# Patient Record
Sex: MALE | Race: WHITE | Employment: UNEMPLOYED | URBAN - METROPOLITAN AREA
[De-identification: names, ages, dates, MRNs, and addresses within clinical notes are randomized per-mention and may not be internally consistent; named-entity substitution may affect disease eponyms.]

---

## 2017-02-16 DIAGNOSIS — Z21 HIV (HUMAN IMMUNODEFICIENCY VIRUS INFECTION) (H): ICD-10-CM

## 2017-02-16 RX ORDER — DEXAMETHASONE 0.75 MG/1
TABLET ORAL
Qty: 30 TABLET | Refills: 3 | Status: SHIPPED | OUTPATIENT
Start: 2017-02-16 | End: 2017-04-27

## 2017-02-16 RX ORDER — ATAZANAVIR AND COBICISTAT 300; 150 MG/1; MG/1
TABLET ORAL
Qty: 30 TABLET | Refills: 3 | Status: SHIPPED | OUTPATIENT
Start: 2017-02-16 | End: 2017-04-27

## 2017-03-17 DIAGNOSIS — B20 HUMAN IMMUNODEFICIENCY VIRUS (HIV) DISEASE (H): Primary | ICD-10-CM

## 2017-03-17 NOTE — NURSING NOTE
Per [facility] Ambulatory Care Protocol, Pt is due for routine labs based on disease state or monitoring of medications. Lab orders entered.  Serenity Vang RN

## 2017-04-04 DIAGNOSIS — B20 HUMAN IMMUNODEFICIENCY VIRUS (HIV) DISEASE (H): ICD-10-CM

## 2017-04-04 LAB
ALBUMIN SERPL-MCNC: 3.9 G/DL (ref 3.4–5)
ALP SERPL-CCNC: 110 U/L (ref 40–150)
ALT SERPL W P-5'-P-CCNC: 36 U/L (ref 0–70)
ANION GAP SERPL CALCULATED.3IONS-SCNC: 7 MMOL/L (ref 3–14)
AST SERPL W P-5'-P-CCNC: 37 U/L (ref 0–45)
BASOPHILS # BLD AUTO: 0 10E9/L (ref 0–0.2)
BASOPHILS NFR BLD AUTO: 0.5 %
BILIRUB SERPL-MCNC: 2.7 MG/DL (ref 0.2–1.3)
BUN SERPL-MCNC: 16 MG/DL (ref 7–30)
CALCIUM SERPL-MCNC: 8.7 MG/DL (ref 8.5–10.1)
CD3 CELLS # BLD: 1915 CELLS/UL (ref 603–2990)
CD3 CELLS NFR BLD: 70 % (ref 49–84)
CD3+CD4+ CELLS # BLD: 702 CELLS/UL (ref 441–2156)
CD3+CD4+ CELLS NFR BLD: 26 % (ref 28–63)
CD3+CD4+ CELLS/CD3+CD8+ CLL BLD: 0.62 % (ref 1.4–2.6)
CD3+CD8+ CELLS # BLD: 1158 CELLS/UL (ref 125–1312)
CD3+CD8+ CELLS NFR BLD: 42 % (ref 10–40)
CHLORIDE SERPL-SCNC: 105 MMOL/L (ref 94–109)
CO2 SERPL-SCNC: 29 MMOL/L (ref 20–32)
CREAT SERPL-MCNC: 0.8 MG/DL (ref 0.66–1.25)
DIFFERENTIAL METHOD BLD: NORMAL
EOSINOPHIL # BLD AUTO: 0.2 10E9/L (ref 0–0.7)
EOSINOPHIL NFR BLD AUTO: 2.2 %
ERYTHROCYTE [DISTWIDTH] IN BLOOD BY AUTOMATED COUNT: 12.7 % (ref 10–15)
GFR SERPL CREATININE-BSD FRML MDRD: ABNORMAL ML/MIN/1.7M2
GLUCOSE SERPL-MCNC: 104 MG/DL (ref 70–99)
HCT VFR BLD AUTO: 42.6 % (ref 40–53)
HGB BLD-MCNC: 14.3 G/DL (ref 13.3–17.7)
IFC SPECIMEN: ABNORMAL
IMM GRANULOCYTES # BLD: 0 10E9/L (ref 0–0.4)
IMM GRANULOCYTES NFR BLD: 0.2 %
IMMUNODEFICIENCY MARKERS SPEC-IMP: ABNORMAL
LYMPHOCYTES # BLD AUTO: 2.6 10E9/L (ref 0.8–5.3)
LYMPHOCYTES NFR BLD AUTO: 32.5 %
MCH RBC QN AUTO: 31 PG (ref 26.5–33)
MCHC RBC AUTO-ENTMCNC: 33.6 G/DL (ref 31.5–36.5)
MCV RBC AUTO: 92 FL (ref 78–100)
MONOCYTES # BLD AUTO: 0.7 10E9/L (ref 0–1.3)
MONOCYTES NFR BLD AUTO: 8.6 %
NEUTROPHILS # BLD AUTO: 4.5 10E9/L (ref 1.6–8.3)
NEUTROPHILS NFR BLD AUTO: 56 %
NRBC # BLD AUTO: 0 10*3/UL
NRBC BLD AUTO-RTO: 0 /100
PLATELET # BLD AUTO: 284 10E9/L (ref 150–450)
POTASSIUM SERPL-SCNC: 4.2 MMOL/L (ref 3.4–5.3)
PROT SERPL-MCNC: 7.4 G/DL (ref 6.8–8.8)
RBC # BLD AUTO: 4.62 10E12/L (ref 4.4–5.9)
SODIUM SERPL-SCNC: 140 MMOL/L (ref 133–144)
WBC # BLD AUTO: 8.1 10E9/L (ref 4–11)

## 2017-04-04 PROCEDURE — 87536 HIV-1 QUANT&REVRSE TRNSCRPJ: CPT | Performed by: COLON & RECTAL SURGERY

## 2017-04-04 PROCEDURE — 86360 T CELL ABSOLUTE COUNT/RATIO: CPT | Performed by: COLON & RECTAL SURGERY

## 2017-04-04 PROCEDURE — 86359 T CELLS TOTAL COUNT: CPT | Performed by: COLON & RECTAL SURGERY

## 2017-04-07 LAB
HIV1 RNA # PLAS NAA DL=20: 24 {COPIES}/ML
HIV1 RNA SERPL NAA+PROBE-LOG#: 1.4 {LOG_COPIES}/ML

## 2017-04-26 ENCOUNTER — TELEPHONE (OUTPATIENT)
Dept: INFECTIOUS DISEASES | Facility: CLINIC | Age: 32
End: 2017-04-26

## 2017-04-27 ENCOUNTER — OFFICE VISIT (OUTPATIENT)
Dept: INFECTIOUS DISEASES | Facility: CLINIC | Age: 32
End: 2017-04-27
Attending: COLON & RECTAL SURGERY
Payer: MEDICAID

## 2017-04-27 VITALS
SYSTOLIC BLOOD PRESSURE: 123 MMHG | BODY MASS INDEX: 26.92 KG/M2 | HEIGHT: 68 IN | TEMPERATURE: 98 F | WEIGHT: 177.6 LBS | HEART RATE: 92 BPM | DIASTOLIC BLOOD PRESSURE: 69 MMHG

## 2017-04-27 DIAGNOSIS — B20 HUMAN IMMUNODEFICIENCY VIRUS (HIV) DISEASE (H): Primary | ICD-10-CM

## 2017-04-27 DIAGNOSIS — Z21 HIV (HUMAN IMMUNODEFICIENCY VIRUS INFECTION) (H): ICD-10-CM

## 2017-04-27 PROCEDURE — 88112 CYTOPATH CELL ENHANCE TECH: CPT | Performed by: COLON & RECTAL SURGERY

## 2017-04-27 PROCEDURE — 99213 OFFICE O/P EST LOW 20 MIN: CPT | Mod: ZF

## 2017-04-27 RX ORDER — BUPRENORPHINE HYDROCHLORIDE AND NALOXONE HYDROCHLORIDE DIHYDRATE 8; 2 MG/1; MG/1
1 TABLET SUBLINGUAL 2 TIMES DAILY
COMMUNITY
End: 2018-03-18

## 2017-04-27 ASSESSMENT — PAIN SCALES - GENERAL: PAINLEVEL: NO PAIN (0)

## 2017-04-27 NOTE — MR AVS SNAPSHOT
After Visit Summary   4/27/2017    Tejinder Doyle    MRN: [id number]           Patient Information     Date Of Birth          1985        Visit Information        Provider Department      4/27/2017 1:00 PM Melania Fried MD [facility] Infectious Diseases        Today's Diagnoses     Human immunodeficiency virus (HIV) disease (H)    -  1    HIV (Human Immunodeficiency Virus Infection)           Follow-ups after your visit        Follow-up notes from your care team     Return in about 2 months (around 6/27/2017).      Who to contact     If you have questions or need follow up information about today's clinic visit or your schedule please contact [facility] AND INFECTIOUS DISEASES directly at [phone].  Normal or non-critical lab and imaging results will be communicated to you by PubliAtishart, letter or phone within 4 business days after the clinic has received the results. If you do not hear from us within 7 days, please contact the clinic through PubliAtishart or phone. If you have a critical or abnormal lab result, we will notify you by phone as soon as possible.  Submit refill requests through Askem or call your pharmacy and they will forward the refill request to us. Please allow 3 business days for your refill to be completed.          Additional Information About Your Visit        MyChart Information     Askem gives you secure access to your electronic health record. If you see a primary care provider, you can also send messages to your care team and make appointments. If you have questions, please call your primary care clinic.  If you do not have a primary care provider, please call [phone] and they will assist you.        Care EveryWhere ID     This is your Care EveryWhere ID. This could be used by other organizations to access your [place] medical records  [id number]        Your Vitals Were     Pulse Temperature Height BMI (Body Mass Index)  "         92 98  F (36.7  C) (Oral) 1.727 m (5' 8\") 27 kg/m2         Blood Pressure from Last 3 Encounters:   04/27/17 123/69   04/07/16 131/81   02/17/16 137/81    Weight from Last 3 Encounters:   04/27/17 80.6 kg (177 lb 9.6 oz)   04/07/16 81.8 kg (180 lb 4.8 oz)   02/17/16 80.5 kg (177 lb 8 oz)              We Performed the Following     Anal Pap Smear          Today's Medication Changes          These changes are accurate as of: 4/27/17 11:59 PM.  If you have any questions, ask your nurse or doctor.               Start taking these medicines.        Dose/Directions    emtricitabine-tenofovir -25 MG per tablet   Commonly known as:  DESCOVY   Used for:  Human immunodeficiency virus (HIV) disease (H)   Started by:  Melania Fried MD        Dose:  1 tablet   Take 1 tablet by mouth daily   Quantity:  90 tablet   Refills:  3         These medicines have changed or have updated prescriptions.        Dose/Directions    atazanavir-cobicistat 300-150 MG table   Commonly known as:  EVOTAZ   This may have changed:  See the new instructions.   Used for:  HIV (human immunodeficiency virus infection) (H)   Changed by:  Melania Fried MD        Dose:  1 tablet   Take 1 tablet by mouth daily   Quantity:  90 tablet   Refills:  3         Stop taking these medicines if you haven't already. Please contact your care team if you have questions.     citalopram 20 MG tablet   Commonly known as:  celeXA   Stopped by:  Melania Fried MD           TRUVADA 200-300 MG per tablet   Generic drug:  emtricitabine-tenofovir   Stopped by:  Melania Fried MD                Where to get your medicines      These medications were sent to [place] [id number] [facility] [address] [place] [address] [address] [place] [id number]    Hours:  TRANSPLANT PHONE NUMBER [phone] Phone:  [phone]     atazanavir-cobicistat 300-150 MG table    emtricitabine-tenofovir AF " 200-25 MG per tablet                Primary Care Provider Office Phone #    [facility] [phone]       No address on file        Thank you!     Thank you for choosing [facility] AND INFECTIOUS DISEASES  for your care. Our goal is always to provide you with excellent care. Hearing back from our patients is one way we can continue to improve our services. Please take a few minutes to complete the written survey that you may receive in the mail after your visit with us. Thank you!             Your Updated Medication List - Protect others around you: Learn how to safely use, store and throw away your medicines at www.disposemymeds.org.          This list is accurate as of: 4/27/17 11:59 PM.  Always use your most recent med list.                   Brand Name Dispense Instructions for use    atazanavir-cobicistat 300-150 MG table    EVOTAZ    90 tablet    Take 1 tablet by mouth daily       BACLOFEN PO      Take 10 mg by mouth 3 times daily       buprenorphine-naloxone 8-2 MG Subl sublingual tablet    SUBOXONE     Place 1 tablet under the tongue 2 times daily 2 MG       emtricitabine-tenofovir -25 MG per tablet    DESCOVY    90 tablet    Take 1 tablet by mouth daily       GABAPENTIN PO      Take 400 mg by mouth 3 times daily 100mg-400mg TID       LAMICTAL PO      Take 100 mg by mouth daily

## 2017-04-27 NOTE — LETTER
Date:May 2, 2017      Patient was self referred, no letter generated. Do not send.        [facility] Health Information

## 2017-04-27 NOTE — PROGRESS NOTES
April 27, 2017    REASON FOR VISIT: Follow up HIV.     HISTORY OF PRESENT ILLNESS:    Mr. Doyle is a very pleasant 31-year-old gentleman who is followed for HIV/AIDS (CD4 alyssia 13) .  He returns today for routine follow up.     Patient was last seen approximately a year ago and since that time he relapsed on IV methamphetamine and was actually in long-term from Jan-Feb, and in treatment program since then. He is actually due to be discharged to a sober house in four days. He is looking forward to the move.    Patient is taking Evotaz and Truvada. He was off for a period of time earlier in the year when he was using however he has been consistently taking it (meds have been administered to him) since January. He denies any side effects or other concerns with this med. He has also been started on lamotrigine, Suboxone and gabapentin. He denies any significant side effects from these meds as well.     He is physically feeling well and has not had any recent illnesses.  He feels that his mood is stable currently. He plans to continue following with a mental health provider after discharge from his treatment program.     He went to [facility] and was tested for gonorrhea, chlamydia and Hep C one month ago and all were negative. He reports his RPR was 1.     PAST MEDICAL HISTORY:  1. HIV diagnosed in 08/2010, started on Atripla 10/2010. CD4 alyssia 13. Stopped treatment in 9/12 (developed K103 mutation); started Stribild 6/2013.  Off from 10/2014 - 8/2014.  Kaylee/pheno with Ral and Elvitegravir resistance. Boosted atazanavir+ truvada initiated 8/2014.  Stopped winter of 2014. Evotaz + Truvada started 1/2016. Patient took break from medications when relapsed using methamphetamine however has been taking with 100% adherence since January.   2. 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 Off of suppressive valtrex.  3. Anxiety.  4. Methamphetamine and benzodiazepine abuse with multiple relapses  5. Status post excision of epidermal  "inclusion cyst, August 2014.     MEDICATIONS:  Current Outpatient Prescriptions   Medication     LamoTRIgine (LAMICTAL PO)     GABAPENTIN PO     BACLOFEN PO     buprenorphine-naloxone (SUBOXONE) 8-2 MG SUBL sublingual tablet     emtricitabine-tenofovir AF (DESCOVY) 200-25 MG per tablet     atazanavir-cobicistat (EVOTAZ) 300-150 MG table     No current facility-administered medications for this visit.        FAMILY/SOCIAL HISTORY:  Reviewed.      EXAM:  /69  Pulse 92  Temp 98  F (36.7  C) (Oral)  Ht 1.727 m (5' 8\")  Wt 80.6 kg (177 lb 9.6 oz)  BMI 27 kg/m2    GEN: Pleasant healthy appearing male in no distress  HEENT: PERRL, no scleral icterus.  Moist mucosal membranes. No thrush, erythema, or oral ulcerations.   NECK: Supple, nontender.  CARDIAC: RRR, normal S1/S2, no murmurs or rubs.   RESPIRATORY: Lung fields clear to auscultation bilaterally, no wheezing or crackles.  ABDOMEN: Soft, non tender and nondistended. Normoactive bowel sounds.  EXTREMITIES: No edema.  Warm and well perfused.  SKIN:  Multiple tattoos. No acute rashes/lesions.   Neuro: Grossly intact.   Psych: Affect normal.  Speech fluent and appropriate.      LAB DATA:  4/4/17:  T Bili 2.7, CMP otherwise wnl  WBC 8.1  Hgb 14.3   Plts 284    ASSESSMENT AND RECOMMENDATIONS: 30 year old year old male with HIV/AIDS (CD4 alyssia 13) and recurrent methamphetamine/benzodiazepam addiction currently in inpatient treatment with plan to discharge in four days, who is returning to clinic for follow up.     1)  HIV/AIDS:   Longstanding disease with multiple periods of being off of therapy, generally during times of substance abuse relapse.  Now sober since early January and has been receiving medications (Evotaz and Truvada) while in [facility] and in chem dep treatment - denies side effects.  Despite period of interruption in therapy corresponding to relapse of methamphetamine use during 2016, on last check 4/4/17 the patient's viral load was 24 and CD4 count " 702.   - Cont Evotaz, will replace Truvada with Descovy. Discussed with patient and he is agreeable.   - patient received screen for gonorrhea, chlamydia and Hep C one month ago at [facility] and all were negative. He reports his RPR was 1 (Diagnosed 2/16 with RPR 32, received 3 treatment doses of bicillin and titer down to 8 on 4/16).   - return to clinic in 2-3 months for repeat viral load, T cell subset    2)  Methamphetamine/benzodiazepine abuse:    Relapse this year leading to [facility] from Jan - Feb 2017. Currently in treatment program and plan to discharge in four days. Hep C recently check at [facility] and negative. Patient is on Suboxone.   - Will be living in sober house  - HCV negative over 3 months after last IVDU    3) Depression/anxiety:    Will continue to follow with mental health after discharge from treatment. He has been started on lamotrigine, gabapentin and is also on Suboxone. Reviewed potential interactions with Lamotrigine and Suboxone today - mostly slighlty increased levels of lamotrigine and Suboxone - patient is on relatively low doses of these and is not having rosy side effects. If he were to switch off cobicistat he may experience decrease in levels of these meds.   - Monitor for med changes    4)  Social issues:  He is in the process of applying for social security given difficulty working while trying to deal with his addiction.  He knows that we are happy to provide any medical documentation that is needed.     6) Routine HIV care.   a. Prophylaxis.    -None needed given CD4.  b. Infectious Disease screening.   -HCV  reported neg [facility] 3/17   -Toxo IgG neg   -GC/chlamydia reported neg at [facility] 3/17   -Syphilis RPR titer reportedly 1 at [facility] 3/17 (down from 32 prior to treatment)   -Quant gold negative August 2014  c. Vaccination status.    -Completed firstTwinrix vaccine series April 2011 but did not develop an antibody response to either; repeat series completed  earlier this year.  Seroconverted for Hep B but not Hep A.  Seems unlikely that repeat doses would lead to seroconversion at this point.  Discussed risk factors and HAV transmission.  Would consider Hep A immune globulin if going to high risk area but otherwise not indicated.    -Pneumovax 2010; booster 2016   -Prevnar August 2014   -Influenza 2015   -Gardasil 2012   -Tdap August 2014   - Completed Menactra  d. Cardiovascular and renal screening.    -Lipids: cont yearly screening.     -BP okay today.   -Cr has been normal   -No longer smoking.  e. Cancer screening.    -Anal pap today   -No longer using tanning beds.      Tejinder will return to clinic in 2-3 months, sooner if issues arise.     Patient seen and discussed with attending physician Dr. Melania Holcomb  PGY-3 Internal Medicine  [facility] [phone]    Attending Attestation:  I evaluated Mr. Doyle with resident Dr. Bravo.  I have reviewed today's labs, vitals and imaging results. This note reflects our joint findings, assessment and recommendations.    Melania Fried MD  [phone]

## 2017-04-27 NOTE — LETTER
4/27/2017       RE: Tejinder Doyle  [address]     [facility] [address]     Dear Colleague,    Thank you for referring your patient, Tejinder Doyle, to the [facility] AND INFECTIOUS DISEASES at [facility]. Please see a copy of my visit note below.    April 27, 2017    REASON FOR VISIT: Follow up HIV.     HISTORY OF PRESENT ILLNESS:    Mr. Doyle is a very pleasant 31-year-old gentleman who is followed for HIV/AIDS (CD4 alyssia 13) .  He returns today for routine follow up.     Patient was last seen approximately a year ago and since that time he relapsed on IV methamphetamine and was actually in half-way from Jan-Feb, and in treatment program since then. He is actually due to be discharged to a sober house in four days. He is looking forward to the move.    Patient is taking Evotaz and Truvada. He was off for a period of time earlier in the year when he was using however he has been consistently taking it (meds have been administered to him) since January. He denies any side effects or other concerns with this med. He has also been started on lamotrigine, Suboxone and gabapentin. He denies any significant side effects from these meds as well.     He is physically feeling well and has not had any recent illnesses.  He feels that his mood is stable currently. He plans to continue following with a mental health provider after discharge from his treatment program.     He went to [facility] and was tested for gonorrhea, chlamydia and Hep C one month ago and all were negative. He reports his RPR was 1.     PAST MEDICAL HISTORY:  1. HIV diagnosed in 08/2010, started on Atripla 10/2010. CD4 alyssia 13. Stopped treatment in 9/12 (developed K103 mutation); started Stribild 6/2013.  Off from 10/2014 - 8/2014.  Kaylee/pheno with Ral and Elvitegravir resistance. Boosted atazanavir+ truvada initiated 8/2014.  Stopped winter of 2014. Evotaz + Truvada started  "1/2016. Patient took break from medications when relapsed using methamphetamine however has been taking with 100% adherence since January.   2. 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 Off of suppressive valtrex.  3. Anxiety.  4. Methamphetamine and benzodiazepine abuse with multiple relapses  5. Status post excision of epidermal inclusion cyst, August 2014.     MEDICATIONS:  Current Outpatient Prescriptions   Medication     LamoTRIgine (LAMICTAL PO)     GABAPENTIN PO     BACLOFEN PO     buprenorphine-naloxone (SUBOXONE) 8-2 MG SUBL sublingual tablet     emtricitabine-tenofovir AF (DESCOVY) 200-25 MG per tablet     atazanavir-cobicistat (EVOTAZ) 300-150 MG table     No current facility-administered medications for this visit.        FAMILY/SOCIAL HISTORY:  Reviewed.      EXAM:  /69  Pulse 92  Temp 98  F (36.7  C) (Oral)  Ht 1.727 m (5' 8\")  Wt 80.6 kg (177 lb 9.6 oz)  BMI 27 kg/m2    GEN: Pleasant healthy appearing male in no distress  HEENT: PERRL, no scleral icterus.  Moist mucosal membranes. No thrush, erythema, or oral ulcerations.   NECK: Supple, nontender.  CARDIAC: RRR, normal S1/S2, no murmurs or rubs.   RESPIRATORY: Lung fields clear to auscultation bilaterally, no wheezing or crackles.  ABDOMEN: Soft, non tender and nondistended. Normoactive bowel sounds.  EXTREMITIES: No edema.  Warm and well perfused.  SKIN:  Multiple tattoos. No acute rashes/lesions.   Neuro: Grossly intact.   Psych: Affect normal.  Speech fluent and appropriate.      LAB DATA:  4/4/17:  T Bili 2.7, CMP otherwise wnl  WBC 8.1  Hgb 14.3   Plts 284    ASSESSMENT AND RECOMMENDATIONS: 30 year old year old male with HIV/AIDS (CD4 alyssia 13) and recurrent methamphetamine/benzodiazepam addiction currently in inpatient treatment with plan to discharge in four days, who is returning to clinic for follow up.     1)  HIV/AIDS:   Longstanding disease with multiple periods of being off of therapy, generally during times of substance abuse " relapse.  Now sober since early January and has been receiving medications (Evotaz and Truvada) while in [facility] and in chem dep treatment - denies side effects.  Despite period of interruption in therapy corresponding to relapse of methamphetamine use during 2016, on last check 4/4/17 the patient's viral load was 24 and CD4 count 702.   - Cont Evotaz, will replace Truvada with Descovy. Discussed with patient and he is agreeable.   - patient received screen for gonorrhea, chlamydia and Hep C one month ago at [facility] and all were negative. He reports his RPR was 1 (Diagnosed 2/16 with RPR 32, received 3 treatment doses of bicillin and titer down to 8 on 4/16).   - return to clinic in 2-3 months for repeat viral load, T cell subset    2)  Methamphetamine/benzodiazepine abuse:    Relapse this year leading to [facility] from Jan - Feb 2017. Currently in treatment program and plan to discharge in four days. Hep C recently check at [facility] and negative. Patient is on Suboxone.   - Will be living in sober house  - HCV negative over 3 months after last IVDU    3) Depression/anxiety:    Will continue to follow with mental health after discharge from treatment. He has been started on lamotrigine, gabapentin and is also on Suboxone. Reviewed potential interactions with Lamotrigine and Suboxone today - mostly slighlty increased levels of lamotrigine and Suboxone - patient is on relatively low doses of these and is not having rosy side effects. If he were to switch off cobicistat he may experience decrease in levels of these meds.   - Monitor for med changes    4)  Social issues:  He is in the process of applying for social security given difficulty working while trying to deal with his addiction.  He knows that we are happy to provide any medical documentation that is needed.     6) Routine HIV care.   a. Prophylaxis.    -None needed given CD4.  b. Infectious Disease screening.   -HCV  [facility] neg [facility]   -Toxo IgG neg   -GC/chlamydia reported neg [facility] 3/17   -Syphilis RPR titer reportedly 1 at [facility] 3/17 (down from 32 prior to treatment)   -Quant gold negative August 2014  c. Vaccination status.    -Completed firstTwinrix vaccine series April 2011 but did not develop an antibody response to either; repeat series completed earlier this year.  Seroconverted for Hep B but not Hep A.  Seems unlikely that repeat doses would lead to seroconversion at this point.  Discussed risk factors and HAV transmission.  Would consider Hep A immune globulin if going to high risk area but otherwise not indicated.    -Pneumovax 2010; booster 2016   -Prevnar August 2014   -Influenza 2015   -Gardasil 2012   -Tdap August 2014   - Completed Menactra  d. Cardiovascular and renal screening.    -Lipids: cont yearly screening.     -BP okay today.   -Cr has been normal   -No longer smoking.  e. Cancer screening.    -Anal pap today   -No longer using tanning beds.      Tejinder will return to clinic in 2-3 months, sooner if issues arise.     Patient seen and discussed with attending physician Dr. Melania Servin Holcomb  PGY-3 Internal Medicine  [facility] [phone]    Attending Attestation:  I evaluated Mr. Doyle with resident Dr. Bravo.  I have reviewed today's labs, vitals and imaging results. This note reflects our joint findings, assessment and recommendations.    Melania Fried MD  [phone]          Again, thank you for allowing me to participate in the care of your patient.      Sincerely,    Melania Fried MD

## 2017-04-27 NOTE — NURSING NOTE
"Chief Complaint   Patient presents with     RECHECK     follow up with B20, tzimmer cma       Initial /69  Pulse 92  Temp 98  F (36.7  C) (Oral)  Ht 1.727 m (5' 8\")  Wt 80.6 kg (177 lb 9.6 oz)  BMI 27 kg/m2 Estimated body mass index is 27 kg/(m^2) as calculated from the following:    Height as of this encounter: 1.727 m (5' 8\").    Weight as of this encounter: 80.6 kg (177 lb 9.6 oz).  Medication Reconciliation: complete    "

## 2017-04-28 LAB — COPATH REPORT: NORMAL

## 2017-11-01 DIAGNOSIS — Z21 HIV (HUMAN IMMUNODEFICIENCY VIRUS INFECTION) (H): Primary | ICD-10-CM

## 2017-11-01 NOTE — NURSING NOTE
Per [facility] Ambulatory Care Protocol, Pt is due for routine labs based on disease state or monitoring of medications. Lab orders entered.  Serenity Vang RN

## 2017-12-18 DIAGNOSIS — Z21 HIV (HUMAN IMMUNODEFICIENCY VIRUS INFECTION) (H): ICD-10-CM

## 2017-12-18 LAB
ALBUMIN SERPL-MCNC: 4.1 G/DL (ref 3.4–5)
ALP SERPL-CCNC: 107 U/L (ref 40–150)
ALT SERPL W P-5'-P-CCNC: 22 U/L (ref 0–70)
ANION GAP SERPL CALCULATED.3IONS-SCNC: 6 MMOL/L (ref 3–14)
AST SERPL W P-5'-P-CCNC: 20 U/L (ref 0–45)
BASOPHILS # BLD AUTO: 0 10E9/L (ref 0–0.2)
BASOPHILS NFR BLD AUTO: 0.5 %
BILIRUB SERPL-MCNC: 2.9 MG/DL (ref 0.2–1.3)
BUN SERPL-MCNC: 10 MG/DL (ref 7–30)
CALCIUM SERPL-MCNC: 8.9 MG/DL (ref 8.5–10.1)
CHLORIDE SERPL-SCNC: 104 MMOL/L (ref 94–109)
CO2 SERPL-SCNC: 26 MMOL/L (ref 20–32)
CREAT SERPL-MCNC: 0.79 MG/DL (ref 0.66–1.25)
DIFFERENTIAL METHOD BLD: NORMAL
EOSINOPHIL # BLD AUTO: 0.1 10E9/L (ref 0–0.7)
EOSINOPHIL NFR BLD AUTO: 1.2 %
ERYTHROCYTE [DISTWIDTH] IN BLOOD BY AUTOMATED COUNT: 12.1 % (ref 10–15)
GFR SERPL CREATININE-BSD FRML MDRD: >90 ML/MIN/1.7M2
GLUCOSE SERPL-MCNC: 81 MG/DL (ref 70–99)
HCT VFR BLD AUTO: 48.4 % (ref 40–53)
HGB BLD-MCNC: 16.6 G/DL (ref 13.3–17.7)
IMM GRANULOCYTES # BLD: 0 10E9/L (ref 0–0.4)
IMM GRANULOCYTES NFR BLD: 0.3 %
LYMPHOCYTES # BLD AUTO: 1.7 10E9/L (ref 0.8–5.3)
LYMPHOCYTES NFR BLD AUTO: 25.9 %
MCH RBC QN AUTO: 30.4 PG (ref 26.5–33)
MCHC RBC AUTO-ENTMCNC: 34.3 G/DL (ref 31.5–36.5)
MCV RBC AUTO: 89 FL (ref 78–100)
MONOCYTES # BLD AUTO: 0.5 10E9/L (ref 0–1.3)
MONOCYTES NFR BLD AUTO: 7.3 %
NEUTROPHILS # BLD AUTO: 4.3 10E9/L (ref 1.6–8.3)
NEUTROPHILS NFR BLD AUTO: 64.8 %
NRBC # BLD AUTO: 0 10*3/UL
NRBC BLD AUTO-RTO: 0 /100
PLATELET # BLD AUTO: 236 10E9/L (ref 150–450)
POTASSIUM SERPL-SCNC: 4.1 MMOL/L (ref 3.4–5.3)
PROT SERPL-MCNC: 7.9 G/DL (ref 6.8–8.8)
RBC # BLD AUTO: 5.46 10E12/L (ref 4.4–5.9)
SODIUM SERPL-SCNC: 136 MMOL/L (ref 133–144)
WBC # BLD AUTO: 6.6 10E9/L (ref 4–11)

## 2017-12-19 LAB
CD3 CELLS # BLD: 1122 CELLS/UL (ref 603–2990)
CD3 CELLS NFR BLD: 68 % (ref 49–84)
CD3+CD4+ CELLS # BLD: 372 CELLS/UL (ref 441–2156)
CD3+CD4+ CELLS NFR BLD: 23 % (ref 28–63)
CD3+CD4+ CELLS/CD3+CD8+ CLL BLD: 0.53 % (ref 1.4–2.6)
CD3+CD8+ CELLS # BLD: 711 CELLS/UL (ref 125–1312)
CD3+CD8+ CELLS NFR BLD: 43 % (ref 10–40)
HIV1 RNA # PLAS NAA DL=20: <20 {COPIES}/ML
HIV1 RNA SERPL NAA+PROBE-LOG#: <1.3 {LOG_COPIES}/ML
IFC SPECIMEN: ABNORMAL

## 2017-12-21 ENCOUNTER — OFFICE VISIT (OUTPATIENT)
Dept: INFECTIOUS DISEASES | Facility: CLINIC | Age: 32
End: 2017-12-21
Attending: COLON & RECTAL SURGERY
Payer: COMMERCIAL

## 2017-12-21 VITALS
HEIGHT: 68 IN | TEMPERATURE: 98.7 F | OXYGEN SATURATION: 96 % | WEIGHT: 176.1 LBS | DIASTOLIC BLOOD PRESSURE: 75 MMHG | BODY MASS INDEX: 26.69 KG/M2 | SYSTOLIC BLOOD PRESSURE: 113 MMHG | HEART RATE: 93 BPM

## 2017-12-21 DIAGNOSIS — Z21 HUMAN IMMUNODEFICIENCY VIRUS I INFECTION (H): Primary | ICD-10-CM

## 2017-12-21 DIAGNOSIS — Z11.3 SCREENING EXAMINATION FOR VENEREAL DISEASE: ICD-10-CM

## 2017-12-21 DIAGNOSIS — R94.31 QT PROLONGATION: ICD-10-CM

## 2017-12-21 PROCEDURE — 88112 CYTOPATH CELL ENHANCE TECH: CPT | Performed by: COLON & RECTAL SURGERY

## 2017-12-21 PROCEDURE — 25000128 H RX IP 250 OP 636: Mod: ZF | Performed by: COLON & RECTAL SURGERY

## 2017-12-21 PROCEDURE — 99213 OFFICE O/P EST LOW 20 MIN: CPT | Mod: 25,ZF

## 2017-12-21 PROCEDURE — G0008 ADMIN INFLUENZA VIRUS VAC: HCPCS | Mod: ZF

## 2017-12-21 PROCEDURE — 90686 IIV4 VACC NO PRSV 0.5 ML IM: CPT | Mod: ZF | Performed by: COLON & RECTAL SURGERY

## 2017-12-21 RX ADMIN — INFLUENZA A VIRUS A/MICHIGAN/45/2015 X-275 (H1N1) ANTIGEN (FORMALDEHYDE INACTIVATED), INFLUENZA A VIRUS A/HONG KONG/4801/2014 X-263B (H3N2) ANTIGEN (FORMALDEHYDE INACTIVATED), INFLUENZA B VIRUS B/PHUKET/3073/2013 ANTIGEN (FORMALDEHYDE INACTIVATED), AND INFLUENZA B VIRUS B/BRISBANE/60/2008 ANTIGEN (FORMALDEHYDE INACTIVATED) 0.5 ML: 15; 15; 15; 15 INJECTION, SUSPENSION INTRAMUSCULAR at 12:45

## 2017-12-21 ASSESSMENT — PAIN SCALES - GENERAL: PAINLEVEL: NO PAIN (0)

## 2017-12-21 NOTE — LETTER
12/21/2017      RE: Tejinder Doyle  [address]  [facility] [address]       REASON FOR VISIT: Follow up HIV.     HISTORY OF PRESENT ILLNESS:    Mr. Doyle is a very pleasant 32 year-old gentleman with HIV/AIDS (CD4 alyssia 13) who was last seen in clinic in April.  He returns today for routine follow up.  Since his last visit he reports doing very well.  He is out of his sober housing and is living in [place], [place] with his boyfriend. They met in treatment and have a very happy and supportive relationship.  He reports ongoing excellent adherence to ARVs (Evotaz + Descovy) and denies any side effects.  He has overall been healthy.  His main concern today is several weeks of feeling congested in his sinuses (he notices pressure and a foggy feeling in his head). He denies fevers, chills,sweats, neurologic sx. He is also concerned about weight gain. He has noticed steady increase in weight since achieving sobreity about a year ago.  He is trying to eat healthy and exercise but this has been tough given the weather.  He and his boyfriend are planning on implementing a healthier lifestyle in the new year.     Tejinder is very proud of himself for making it to a year of sobriety.  He continues to have regular mental healthcare from both a therapist and psychiatrist and feels that his current medication regimen is working well for him.  He intends to stay on suboxone for the time being.     PAST MEDICAL HISTORY:  1. HIV diagnosed in 08/2010, started on Atripla 10/2010. CD4 alyssia 13. Stopped treatment in 9/12 (developed K103 mutation); started Stribild 6/2013.  Off from 10/2014 - 8/2014.  Kaylee/pheno with Ral and Elvitegravir resistance. Boosted atazanavir+ truvada initiated 8/2014.  Stopped winter of 2014. Evotaz + Truvada started 1/2016. Switched to Evotaz + Descovy 4/2017.  2. 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 Off of suppressive valtrex.  3. Anxiety.  4. Methamphetamine and benzodiazepine abuse with multiple relapses.  "Now sober one year.   5. Status post excision of epidermal inclusion cyst, August 2014.     MEDICATIONS:  Current Outpatient Prescriptions   Medication     LamoTRIgine (LAMICTAL PO)     GABAPENTIN PO     buprenorphine-naloxone (SUBOXONE) 8-2 MG SUBL sublingual tablet     emtricitabine-tenofovir AF (DESCOVY) 200-25 MG per tablet     atazanavir-cobicistat (EVOTAZ) 300-150 MG table     BACLOFEN PO     No current facility-administered medications for this visit.        FAMILY/SOCIAL HISTORY:  Reviewed.      EXAM:  /75 (BP Location: Left arm, Patient Position: Sitting, Cuff Size: Adult Regular)  Pulse 93  Temp 98.7  F (37.1  C) (Oral)  Ht 1.727 m (5' 8\")  Wt 79.9 kg (176 lb 1.6 oz)  SpO2 96%  BMI 26.78 kg/m2    GEN: Pleasant healthy appearing male in no distress  HEENT: PERRL, no scleral icterus.  Moist mucosal membranes. No thrush, erythema, or oral ulcerations. TMs clear bilaterally. Mild frontal sinus tenderness.   NECK: Supple, nontender.  CARDIAC: RRR, normal S1/S2, no murmurs or rubs.   RESPIRATORY: Lung fields clear to auscultation bilaterally, no wheezing or crackles.  ABDOMEN: Soft, non tender and nondistended. Normoactive bowel sounds. No anal lesions.   EXTREMITIES: No edema.  Warm and well perfused.  SKIN:  Multiple tattoos. No acute rashes/lesions.   Neuro: Grossly intact.   Psych: Affect normal.  Speech fluent and appropriate.      LAB DATA:  12/18/17:  T Bili 2.9, CMP otherwise wnl  CBC normal  HIV RNA <20  CD4 372 (23%)    ASSESSMENT AND RECOMMENDATIONS: 32 year old year old male with HIV/AIDS (CD4 alyssia 13) and recurrent methamphetamine/benzodiazepam addiction currently sober x1 year, who is returning to clinic for follow up.     1)  HIV/AIDS: Longstanding disease with multiple periods of being off of therapy, generally during times of substance abuse relapse.  Now sober just over a year and doing very well on current regimen (Descovy + Evotaz). There are drug drug interactions with lamictal " and suboxone but these will not decrease ARV levels. However, need to follow QTc.  Tejinder was concerned about lower CD4 - I explained that this may be due to lab variability vs related to possible viral infection (see below). As long as VL remains undetectable I am not worried. However, he would like to repeat CD4 in a month or so.   - Continue Evotaz/Descovy.   - Repeat CD4 in a month.   - Obtain EKG.   - See below for routine HIV care.     2)  Methamphetamine/benzodiazepine abuse: Multiple prior relapses but now sober over a year.  Good support and ongoing mental healthcare.  Suboxone does interact with Evotaz (may increase suboxone levels) so checking EKG as above.   - Continue suboxone and outpt substance abuse treatment.     3) Depression/anxiety:  Symptoms better controlled than they have been for years.  On stable medication regimen and has close outpt mental health follow up.   - Continue current regimen and outpt mental healthcare.   - Recheck med/med interactions if any change in psych meds given use of PI based ARV regimen.     4)  Sinus symptoms:  Quite mild and not associated with systemic symptoms.  Discussed supportive measures.   - He will let me know if sx don't improve.     5) Weight gain:  Not surprising that he gained weight after achieving sobriety.  He is not interested in structured diet/exercise plans at this time and would prefer to try to lose weight on his own.   - Offered tips for healthier eating and increasing activity.   - Will readdress at follow up.     6) Routine HIV care.   a. Prophylaxis.    -None needed given CD4.  b. Infectious Disease screening.   -HCV neg at [facility] 3/17   -Toxo IgG neg   -GC/chlamydia neg at [facility] 3/17   -Syphilis RPR titer 1 at [facility] 3/17 (down from 32 prior to treatment); will repeat in a month.    -Quant gold negative August 2014; denies potential exposures since.   c. Vaccination status.    -Completed firstTwinrix vaccine series April 2011 but did  not develop an antibody response to either; repeat series completed earlier this year.  Seroconverted for Hep B but not Hep A.  Seems unlikely that repeat doses would lead to seroconversion at this point.  Discussed risk factors and HAV transmission.  Would consider Hep A immune globulin if going to high risk area but otherwise not indicated.    -Pneumovax 2010; booster 2016   -Prevnar August 2014   -Influenza already given this year.    -Gardasil 2012   -Tdap August 2014   - Completed Menactra  d. Cardiovascular and renal screening.    -Lipids: cont yearly screening.     -BP okay today.   -Cr has been normal   -No longer smoking.  e. Cancer screening.    -Anal pap today   -No longer using tanning beds.      Tejinder will return to clinic in 6 months, sooner if issues arise.     Melania Fried MD  [phone]    >50% of this 40 minute visit was spent counseling about weight loss, ongoing HIV treatment and mental health.

## 2017-12-21 NOTE — NURSING NOTE
"Injectable Influenza Immunization Documentation    1.  Has the patient received the information for the injectable influenza vaccine? YES     2. Is the patient 6 months of age or older? YES     3. Does the patient have any of the following contraindications?         Severe allergy to eggs? No     Severe allergic reaction to previous influenza vaccines? No   Severe allergy to latex? No       History of Guillain-Clarinda syndrome? No     Currently have a temperature greater than 100.4F? No        4.  Severely egg allergic patients should have flu vaccine eligibility assessed by an MD, RN, or pharmacist, and those who received flu vaccine should be observed for 15 min by an MD, RN, Pharmacist, Medical Technician, or member of clinic staff.\": YES    5. Latex-allergic patients should be given latex-free influenza vaccine Yes. Please reference the Vaccine latex table to determine if your clinic s product is latex-containing.       Vaccination given by Divine Graves [facility]  12/21/2017 12:44 PM            "

## 2017-12-21 NOTE — MR AVS SNAPSHOT
After Visit Summary   12/21/2017    Tejinder Doyle    MRN: [id number]           Patient Information     Date Of Birth          1985        Visit Information        Provider Department      12/21/2017 12:00 PM Melania Fried MD [facility] and Infectious Diseases        Today's Diagnoses     Human immunodeficiency virus I infection (H)    -  1    QT prolongation        Screening examination for venereal disease           Follow-ups after your visit        Follow-up notes from your care team     Return in about 6 months (around 6/21/2018).      Who to contact     If you have questions or need follow up information about today's clinic visit or your schedule please contact [facility] directly at [phone].  Normal or non-critical lab and imaging results will be communicated to you by [employer], letter or phone within 4 business days after the clinic has received the results. If you do not hear from us within 7 days, please contact the clinic through [employer] or phone. If you have a critical or abnormal lab result, we will notify you by phone as soon as possible.  Submit refill requests through [employer] or call your pharmacy and they will forward the refill request to us. Please allow 3 business days for your refill to be completed.          Additional Information About Your Visit        MyChart Information     [employer] gives you secure access to your electronic health record. If you see a primary care provider, you can also send messages to your care team and make appointments. If you have questions, please call your primary care clinic.  If you do not have a primary care provider, please call [phone] and they will assist you.        Care EveryWhere ID     This is your Care EveryWhere ID. This could be used by other organizations to access your [facility] medical records  [id number]        Your Vitals Were     Pulse Temperature Height  "Pulse Oximetry BMI (Body Mass Index)       93 98.7  F (37.1  C) (Oral) 1.727 m (5' 8\") 96% 26.78 kg/m2        Blood Pressure from Last 3 Encounters:   12/21/17 113/75   04/27/17 123/69   04/07/16 131/81    Weight from Last 3 Encounters:   12/21/17 79.9 kg (176 lb 1.6 oz)   04/27/17 80.6 kg (177 lb 9.6 oz)   04/07/16 81.8 kg (180 lb 4.8 oz)              We Performed the Following     Anal Pap Smear        Primary Care Provider Office Phone #    [facility] [phone]       No address on file        Equal Access to Services     LORNA SHANNON : Gavin Pearce, jerrica lewis, qasveta kaalmaheather hall, leon brennan. So [facility] [phone].    ATENCIÓN: Si habla español, tiene a patel disposición servicios gratuitos de asistencia lingüística. Llame al [phone].    We comply with applicable federal civil rights laws and [place] laws. We do not discriminate on the basis of race, color, national origin, age, disability, sex, sexual orientation, or gender identity.            Thank you!     Thank you for choosing [facility]  for your care. Our goal is always to provide you with excellent care. Hearing back from our patients is one way we can continue to improve our services. Please take a few minutes to complete the written survey that you may receive in the mail after your visit with us. Thank you!             Your Updated Medication List - Protect others around you: Learn how to safely use, store and throw away your medicines at www.disposemymeds.org.          This list is accurate as of: 12/21/17 11:59 PM.  Always use your most recent med list.                   Brand Name Dispense Instructions for use Diagnosis    atazanavir-cobicistat 300-150 MG table    EVOTAZ    90 tablet    Take 1 tablet by mouth daily    HIV (human immunodeficiency virus infection) (H)       BACLOFEN PO      Take 10 mg by mouth 3 times daily        " buprenorphine-naloxone 8-2 MG Subl sublingual tablet    SUBOXONE     Place 1 tablet under the tongue 2 times daily 2 MG        emtricitabine-tenofovir -25 MG per tablet    DESCOVY    90 tablet    Take 1 tablet by mouth daily    Human immunodeficiency virus (HIV) disease       GABAPENTIN PO      Take 400 mg by mouth 3 times daily 100mg-400mg TID        LAMICTAL PO      Take 100 mg by mouth daily

## 2017-12-21 NOTE — NURSING NOTE
"Chief Complaint   Patient presents with     RECHECK     follow up  HIV       Initial /75 (BP Location: Left arm, Patient Position: Sitting, Cuff Size: Adult Regular)  Pulse 93  Temp 98.7  F (37.1  C) (Oral)  Ht 1.727 m (5' 8\")  Wt 79.9 kg (176 lb 1.6 oz)  SpO2 96%  BMI 26.78 kg/m2 Estimated body mass index is 26.78 kg/(m^2) as calculated from the following:    Height as of this encounter: 1.727 m (5' 8\").    Weight as of this encounter: 79.9 kg (176 lb 1.6 oz).  Medication Reconciliation: complete    "

## 2017-12-22 LAB — COPATH REPORT: NORMAL

## 2017-12-28 NOTE — PROGRESS NOTES
REASON FOR VISIT: Follow up HIV.     HISTORY OF PRESENT ILLNESS:    Mr. Doyle is a very pleasant 32 year-old gentleman with HIV/AIDS (CD4 alyssia 13) who was last seen in clinic in April.  He returns today for routine follow up.  Since his last visit he reports doing very well.  He is out of his sober housing and is living in [place], [place] with his boyfriend. They met in treatment and have a very happy and supportive relationship.  He reports ongoing excellent adherence to ARVs (Evotaz + Descovy) and denies any side effects.  He has overall been healthy.  His main concern today is several weeks of feeling congested in his sinuses (he notices pressure and a foggy feeling in his head). He denies fevers, chills,sweats, neurologic sx. He is also concerned about weight gain. He has noticed steady increase in weight since achieving sobreity about a year ago.  He is trying to eat healthy and exercise but this has been tough given the weather.  He and his boyfriend are planning on implementing a healthier lifestyle in the new year.     Tejinder is very proud of himself for making it to a year of sobriety.  He continues to have regular mental healthcare from both a therapist and psychiatrist and feels that his current medication regimen is working well for him.  He intends to stay on suboxone for the time being.     PAST MEDICAL HISTORY:  1. HIV diagnosed in 08/2010, started on Atripla 10/2010. CD4 alyssia 13. Stopped treatment in 9/12 (developed K103 mutation); started Stribild 6/2013.  Off from 10/2014 - 8/2014.  Kaylee/pheno with Ral and Elvitegravir resistance. Boosted atazanavir+ truvada initiated 8/2014.  Stopped winter of 2014. Evotaz + Truvada started 1/2016. Switched to Evotaz + Descovy 4/2017.  2. 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 Off of suppressive valtrex.  3. Anxiety.  4. Methamphetamine and benzodiazepine abuse with multiple relapses. Now sober one year.   5. Status post excision of epidermal inclusion cyst, August  "2014.     MEDICATIONS:  Current Outpatient Prescriptions   Medication     LamoTRIgine (LAMICTAL PO)     GABAPENTIN PO     buprenorphine-naloxone (SUBOXONE) 8-2 MG SUBL sublingual tablet     emtricitabine-tenofovir AF (DESCOVY) 200-25 MG per tablet     atazanavir-cobicistat (EVOTAZ) 300-150 MG table     BACLOFEN PO     No current facility-administered medications for this visit.        FAMILY/SOCIAL HISTORY:  Reviewed.      EXAM:  /75 (BP Location: Left arm, Patient Position: Sitting, Cuff Size: Adult Regular)  Pulse 93  Temp 98.7  F (37.1  C) (Oral)  Ht 1.727 m (5' 8\")  Wt 79.9 kg (176 lb 1.6 oz)  SpO2 96%  BMI 26.78 kg/m2    GEN: Pleasant healthy appearing male in no distress  HEENT: PERRL, no scleral icterus.  Moist mucosal membranes. No thrush, erythema, or oral ulcerations. TMs clear bilaterally. Mild frontal sinus tenderness.   NECK: Supple, nontender.  CARDIAC: RRR, normal S1/S2, no murmurs or rubs.   RESPIRATORY: Lung fields clear to auscultation bilaterally, no wheezing or crackles.  ABDOMEN: Soft, non tender and nondistended. Normoactive bowel sounds. No anal lesions.   EXTREMITIES: No edema.  Warm and well perfused.  SKIN:  Multiple tattoos. No acute rashes/lesions.   Neuro: Grossly intact.   Psych: Affect normal.  Speech fluent and appropriate.      LAB DATA:  12/18/17:  T Bili 2.9, CMP otherwise wnl  CBC normal  HIV RNA <20  CD4 372 (23%)    ASSESSMENT AND RECOMMENDATIONS: 32 year old year old male with HIV/AIDS (CD4 alyssia 13) and recurrent methamphetamine/benzodiazepam addiction currently sober x1 year, who is returning to clinic for follow up.     1)  HIV/AIDS: Longstanding disease with multiple periods of being off of therapy, generally during times of substance abuse relapse.  Now sober just over a year and doing very well on current regimen (Descovy + Evotaz). There are drug drug interactions with lamictal and suboxone but these will not decrease ARV levels. However, need to follow QTc.  " Tejinder was concerned about lower CD4 - I explained that this may be due to lab variability vs related to possible viral infection (see below). As long as VL remains undetectable I am not worried. However, he would like to repeat CD4 in a month or so.   - Continue Evotaz/Descovy.   - Repeat CD4 in a month.   - Obtain EKG.   - See below for routine HIV care.     2)  Methamphetamine/benzodiazepine abuse: Multiple prior relapses but now sober over a year.  Good support and ongoing mental healthcare.  Suboxone does interact with Evotaz (may increase suboxone levels) so checking EKG as above.   - Continue suboxone and outpt substance abuse treatment.     3) Depression/anxiety:  Symptoms better controlled than they have been for years.  On stable medication regimen and has close outpt mental health follow up.   - Continue current regimen and outpt mental healthcare.   - Recheck med/med interactions if any change in psych meds given use of PI based ARV regimen.     4)  Sinus symptoms:  Quite mild and not associated with systemic symptoms.  Discussed supportive measures.   - He will let me know if sx don't improve.     5) Weight gain:  Not surprising that he gained weight after achieving sobriety.  He is not interested in structured diet/exercise plans at this time and would prefer to try to lose weight on his own.   - Offered tips for healthier eating and increasing activity.   - Will readdress at follow up.     6) Routine HIV care.   a. Prophylaxis.    -None needed given CD4.  b. Infectious Disease screening.   -HCV neg at [facility] 3/17   -Toxo IgG neg   -GC/chlamydia neg at [facility] 3/17   -Syphilis RPR titer 1 at [facility] 3/17 (down from 32 prior to treatment); will repeat in a month.    -Quant gold negative August 2014; denies potential exposures since.   c. Vaccination status.    -Completed firstTwinrix vaccine series April 2011 but did not develop an antibody response to either; repeat series completed earlier this  year.  Seroconverted for Hep B but not Hep A.  Seems unlikely that repeat doses would lead to seroconversion at this point.  Discussed risk factors and HAV transmission.  Would consider Hep A immune globulin if going to high risk area but otherwise not indicated.    -Pneumovax 2010; booster 2016   -Prevnar August 2014   -Influenza already given this year.    -Gardasil 2012   -Tdap August 2014   - Completed Menactra  d. Cardiovascular and renal screening.    -Lipids: cont yearly screening.     -BP okay today.   -Cr has been normal   -No longer smoking.  e. Cancer screening.    -Anal pap today   -No longer using tanning beds.      Tejinder will return to clinic in 6 months, sooner if issues arise.       Melania Fried MD  [phone]    >50% of this 40 minute visit was spent counseling about weight loss, ongoing HIV treatment and mental health.

## 2018-01-11 DIAGNOSIS — R85.619 ABNORMAL ANAL PAPANICOLAOU SMEAR: Primary | ICD-10-CM

## 2018-03-18 ENCOUNTER — HOSPITAL ENCOUNTER (INPATIENT)
Facility: CLINIC | Age: 33
LOS: 8 days | Discharge: PSYCHIATRIC OR SUBSTANCE USE FACILITY | End: 2018-03-27
Attending: PSYCHIATRY & NEUROLOGY | Admitting: PSYCHIATRY & NEUROLOGY
Payer: MEDICAID

## 2018-03-18 DIAGNOSIS — F31.81 BIPOLAR 2 DISORDER (H): Primary | ICD-10-CM

## 2018-03-18 DIAGNOSIS — R45.851 SUICIDAL IDEATION: ICD-10-CM

## 2018-03-18 DIAGNOSIS — F19.94 SUBSTANCE INDUCED MOOD DISORDER (H): ICD-10-CM

## 2018-03-18 DIAGNOSIS — F19.24: ICD-10-CM

## 2018-03-18 DIAGNOSIS — F19.20 CHEMICAL DEPENDENCY (H): ICD-10-CM

## 2018-03-18 DIAGNOSIS — F17.210 CIGARETTE NICOTINE DEPENDENCE WITHOUT COMPLICATION: ICD-10-CM

## 2018-03-18 DIAGNOSIS — Z21 HIV (HUMAN IMMUNODEFICIENCY VIRUS INFECTION) (H): ICD-10-CM

## 2018-03-18 DIAGNOSIS — F19.10 POLYSUBSTANCE ABUSE (H): ICD-10-CM

## 2018-03-18 DIAGNOSIS — Z21 ASYMPTOMATIC HUMAN IMMUNODEFICIENCY VIRUS (HIV) INFECTION STATUS (H): ICD-10-CM

## 2018-03-18 LAB
AMPHETAMINES UR QL SCN: POSITIVE
BARBITURATES UR QL: NEGATIVE
BENZODIAZ UR QL: POSITIVE
CANNABINOIDS UR QL SCN: NEGATIVE
COCAINE UR QL: NEGATIVE
ETHANOL UR QL SCN: NEGATIVE
OPIATES UR QL SCN: NEGATIVE

## 2018-03-18 PROCEDURE — 80320 DRUG SCREEN QUANTALCOHOLS: CPT | Performed by: FAMILY MEDICINE

## 2018-03-18 PROCEDURE — 99285 EMERGENCY DEPT VISIT HI MDM: CPT | Mod: 25 | Performed by: PSYCHIATRY & NEUROLOGY

## 2018-03-18 PROCEDURE — 99285 EMERGENCY DEPT VISIT HI MDM: CPT | Mod: Z6 | Performed by: PSYCHIATRY & NEUROLOGY

## 2018-03-18 PROCEDURE — 90791 PSYCH DIAGNOSTIC EVALUATION: CPT

## 2018-03-18 PROCEDURE — 80307 DRUG TEST PRSMV CHEM ANLYZR: CPT | Performed by: FAMILY MEDICINE

## 2018-03-18 RX ORDER — LAMOTRIGINE 100 MG/1
200 TABLET ORAL DAILY
Status: ON HOLD | COMMUNITY
End: 2018-03-27

## 2018-03-18 RX ORDER — BUPROPION HYDROCHLORIDE 150 MG/1
150 TABLET ORAL EVERY MORNING
Status: ON HOLD | COMMUNITY
End: 2018-03-27

## 2018-03-18 RX ORDER — CLONIDINE HYDROCHLORIDE 0.1 MG/1
0.1 TABLET ORAL 3 TIMES DAILY PRN
Status: ON HOLD | COMMUNITY
End: 2018-03-27

## 2018-03-18 RX ORDER — GABAPENTIN 300 MG/1
CAPSULE ORAL
Status: ON HOLD | COMMUNITY
End: 2018-03-27

## 2018-03-18 ASSESSMENT — ENCOUNTER SYMPTOMS
ENDOCRINE NEGATIVE: 1
GASTROINTESTINAL NEGATIVE: 1
HYPERACTIVE: 0
APPETITE CHANGE: 1
NERVOUS/ANXIOUS: 1
ACTIVITY CHANGE: 1
HEMATOLOGIC/LYMPHATIC NEGATIVE: 1
EYES NEGATIVE: 1
CARDIOVASCULAR NEGATIVE: 1
DECREASED CONCENTRATION: 1
SLEEP DISTURBANCE: 1
RESPIRATORY NEGATIVE: 1
NEUROLOGICAL NEGATIVE: 1
MUSCULOSKELETAL NEGATIVE: 1
HALLUCINATIONS: 0

## 2018-03-18 NOTE — ED NOTES
Asked IP nurse to investigate taking this patient off MRSA precautions. Her investigation revealed the patient no longer needs to be on MRSA/contact isolation.

## 2018-03-18 NOTE — IP AVS SNAPSHOT
MRN:[id number]                      After Visit Summary   3/18/2018    Tejinder Doyle    MRN: [id number]           Thank you!     Thank you for choosing [facility] for your care. Our goal is always to provide you with excellent care.        Patient Information     Date Of Birth          1985        About your hospital stay     You were admitted on:  March 19, 2018 You last received care in the:  [facility]    You were discharged on:  March 27, 2018       Who to Call     For medical emergencies, please call 911.  For non-urgent questions about your medical care, please call your primary care provider or clinic, [phone]          Attending Provider     Provider Specialty    Walter Reed MD Psychiatry    [facility]    [facility]    [facility], Francis Oneil MD Psychiatry    Vasu, Ken Graf MD Psychiatry       Primary Care Provider Office Phone #    [facility] [phone]      Further instructions from your care team        Behavioral Discharge Planning and Instructions      Summary:  You were admitted on 3/18/2018  due to Suicidal Ideations and Chemical Use Issues.  You were treated by Dr. Francis Paredes MD and Dr. Ken Leone MD and discharged on 3/27/18  from [facility] [address] [facility].    Principal Diagnosis: Polysubstance Use, Generalized Anxiety Disorder      Health Care Follow-up Appointments:   You are discharging to [facility] for CD treatment  [address] [place] [address]  Phone:  [phone]  [facility] will send discharge summary to Fax: [phone]    Follow up with your therapist: Sarahy @ [facility] [phone]     Attend all scheduled appointments with your outpatient providers. Call at least 24 hours in advance if you need to reschedule an appointment to ensure continued access to your outpatient providers.   Major Treatments, Procedures and  "Findings:  You were provided with: a psychiatric assessment, assessed for medical stability, medication evaluation and/or management, group therapy and milieu management    Symptoms to Report: feeling more aggressive, increased confusion, losing more sleep, mood getting worse or thoughts of suicide    Early warning signs can include: increased depression or anxiety sleep disturbances increased thoughts or behaviors of suicide or self-harm  increased unusual thinking, such as paranoia or hearing voices    Safety and Wellness:  Take all medicines as directed.  Make no changes unless your doctor suggests them.      Follow treatment recommendations.  Refrain from alcohol and non-prescribed drugs.  Ask your support system to help you reduce your access to items that could harm yourself or others. If there is a concern for safety, call 911.    Resources:   Crisis Intervention: 825.173.4963 or 429-323-2645 (TTY: 859.938.1360).  Call anytime for help.  National [place] on Mental Illness (www.mn.latrell.org): 316.233.1044 or 845-369-5846.  Alcoholics Anonymous (www.alcoholics-anonymous.org): Check your phone book for your local chapter.  Suicide Awareness Voices of Education (SAVE) (www.save.org): 754-491-CVSS (1808)  National Suicide Prevention Line (www.mentalhealthmn.org): 649-839-YCLE (0468)  Mental Health Consumer/Survivor Network of [place] (www.mhcsn.net): 764.556.7051 or 137-307-0329  Mental Health Association of [place] (www.mentalhealth.org): 154.113.9950 or 403-167-8248  Self- Management and Recovery Training., SMART-- Toll free: 639.989.5057  www.Vioozer.Blog Talk Radio  [facility] Crisis (COPE) Response - Adult 117 340-8646  Text 4 Life: txt \"LIFE\" to [address]188 for immediate support and crisis intervention  Crisis text line: Text \"START\" to 148-858. Free, confidential, 24/7.    The treatment team has appreciated the opportunity to work with you.     Please take care and make your recovery a daily recovery.   If you have any " "questions or concerns our unit number is [phone].  Thank you.       Pending Results     No orders found from 3/16/2018 to 3/19/2018.            Statement of Approval     Ordered          03/27/18 1230  I have reviewed and agree with all the recommendations and orders detailed in this document.  EFFECTIVE NOW     Approved and electronically signed by:  Ken Leone MD             Admission Information     Date & Time Provider Department Dept. Phone    3/18/2018 Ken Leone MD  [facility] [phone]      Your Vitals Were     Blood Pressure Pulse Temperature Respirations Height Weight    109/60 89 97.4  F (36.3  C) 16 1.727 m (5' 8\") 73.1 kg (161 lb 1.6 oz)    Pulse Oximetry BMI (Body Mass Index)                95% 24.5 kg/m2          MyChart Information     [employer] gives you secure access to your electronic health record. If you see a primary care provider, you can also send messages to your care team and make appointments. If you have questions, please call your primary care clinic.  If you do not have a primary care provider, please call [phone] and they will assist you.        Care EveryWhere ID     This is your Care EveryWhere ID. This could be used by other organizations to access your [facility] medical records  [id number]        Equal Access to Services     LORNA SHANNON : Gavin simmonso Sofeliberto, waaxda luqadaha, qaybta kaalmada adeegyada, leon brennan. So [facility] [phone].    ATENCIÓN: Si habla español, tiene a patel disposición servicios gratuitos de asistencia lingüística. Llame al [phone].    We comply with applicable federal civil rights laws and [place] laws. We do not discriminate on the basis of race, color, national origin, age, disability, sex, sexual orientation, or gender identity.               Review of your medicines      START taking        Dose / Directions    amoxicillin-clavulanate 875-125 MG per tablet   Commonly known as:  " AUGMENTIN   Indication:  gingivitis   Used for:  HIV (human immunodeficiency virus infection) (H)        Dose:  1 tablet   Take 1 tablet by mouth every 12 hours   Quantity:  7 tablet   Refills:  0       benzocaine 20 % Gel gel   Commonly known as:  ORAJEL MAXIMUM STRENGTH   Used for:  HIV (human immunodeficiency virus infection) (H)        Take by mouth 4 times daily as needed for moderate pain   Quantity:  1 Bottle   Refills:  0       chlorhexidine 0.12 % solution   Commonly known as:  PERIDEX   Used for:  HIV (human immunodeficiency virus infection) (H)        Dose:  15 mL   Swish and spit 15 mLs in mouth 2 times daily   Quantity:  473 mL   Refills:  0       hydrOXYzine 50 MG tablet   Commonly known as:  ATARAX   Used for:  Bipolar 2 disorder (H)        Dose:  50 mg   Take 1 tablet (50 mg) by mouth 4 times daily as needed for itching or anxiety   Quantity:  90 tablet   Refills:  1       lamoTRIgine 100 MG 24 hr tablet   Commonly known as:  LaMICtal   Used for:  Bipolar 2 disorder (H)   Replaces:  lamoTRIgine 100 MG tablet        Dose:  200 mg   Start taking on:  3/28/2018   Take 2 tablets (200 mg) by mouth daily   Quantity:  60 tablet   Refills:  1       nicotine polacrilex 4 MG lozenge   Used for:  Cigarette nicotine dependence without complication        Dose:  4-8 mg   Place 1-2 lozenges (4-8 mg) inside cheek every hour as needed for other (nicotine withdrawal symptoms)   Quantity:  135 tablet   Refills:  3       propranolol 10 MG tablet   Commonly known as:  INDERAL   Used for:  Bipolar 2 disorder (H)        Dose:  10 mg   Take 1 tablet (10 mg) by mouth 3 times daily   Quantity:  90 tablet   Refills:  1       traZODone 100 MG tablet   Commonly known as:  DESYREL   Used for:  Bipolar 2 disorder (H)        Dose:  100 mg   Take 1 tablet (100 mg) by mouth nightly as needed for sleep   Quantity:  60 tablet   Refills:  1       venlafaxine 75 MG Tb24 24 hr tablet   Commonly known as:  EFFEXOR-ER   Used for:  Bipolar  2 disorder (H)        Dose:  75 mg   Start taking on:  3/28/2018   Take 1 tablet (75 mg) by mouth daily (with breakfast)   Quantity:  30 each   Refills:  1         CONTINUE these medicines which have NOT CHANGED        Dose / Directions    atazanavir-cobicistat 300-150 MG table   Commonly known as:  EVOTAZ   Used for:  HIV (human immunodeficiency virus infection) (H)        Dose:  1 tablet   Take 1 tablet by mouth daily   Quantity:  90 tablet   Refills:  3       emtricitabine-tenofovir -25 MG per tablet   Commonly known as:  DESCOVY   Used for:  Asymptomatic human immunodeficiency virus (HIV) infection status (H)        Dose:  1 tablet   Take 1 tablet by mouth daily   Quantity:  90 tablet   Refills:  3         STOP taking     buPROPion 150 MG 24 hr tablet   Commonly known as:  WELLBUTRIN XL           cloNIDine 0.1 MG tablet   Commonly known as:  CATAPRES           gabapentin 300 MG capsule   Commonly known as:  NEURONTIN           lamoTRIgine 100 MG tablet   Commonly known as:  LaMICtal   Replaced by:  lamoTRIgine 100 MG 24 hr tablet                Where to get your medicines      These medications were sent to [facility] [address]  [address] [address]     Phone:  [phone]     amoxicillin-clavulanate 875-125 MG per tablet    atazanavir-cobicistat 300-150 MG table    benzocaine 20 % Gel gel    chlorhexidine 0.12 % solution    emtricitabine-tenofovir -25 MG per tablet    hydrOXYzine 50 MG tablet    lamoTRIgine 100 MG 24 hr tablet    nicotine polacrilex 4 MG lozenge    propranolol 10 MG tablet    traZODone 100 MG tablet    venlafaxine 75 MG Tb24 24 hr tablet                Protect others around you: Learn how to safely use, store and throw away your medicines at www.disposemymeds.org.        ANTIBIOTIC INSTRUCTION     You've Been Prescribed an Antibiotic - Now What?  Your healthcare team thinks that you or your loved one might have an  infection. Some infections can be treated with antibiotics, which are powerful, life-saving drugs. Like all medications, antibiotics have side effects and should only be used when necessary. There are some important things you should know about your antibiotic treatment.      Your healthcare team may run tests before you start taking an antibiotic.    Your team may take samples (e.g., from your blood, urine or other areas) to run tests to look for bacteria. These test can be important to determine if you need an antibiotic at all and, if you do, which antibiotic will work best.      Within a few days, your healthcare team might change or even stop your antibiotic.    Your team may start you on an antibiotic while they are working to find out what is making you sick.    Your team might change your antibiotic because test results show that a different antibiotic would be better to treat your infection.    In some cases, once your team has more information, they learn that you do not need an antibiotic at all. They may find out that you don't have an infection, or that the antibiotic you're taking won't work against your infection. For example, an infection caused by a virus can't be treated with antibiotics. Staying on an antibiotic when you don't need it is more likely to be harmful than helpful.      You may experience side effects from your antibiotic.    Like all medications, antibiotics have side effects. Some of these can be serious.    Let you healthcare team know if you have any known allergies when you are admitted to the hospital.    One significant side effect of nearly all antibiotics is the risk of severe and sometimes deadly diarrhea caused by Clostridium difficile (C. Difficile). This occurs when a person takes antibiotics because some good germs are destroyed. Antibiotic use allows C. diificile to take over, putting patients at high risk for this serious infection.    As a patient or caregiver, it is  important to understand your or your loved one's antibiotic treatment. It is especially important for caregivers to speak up when patients can't speak for themselves. Here are some important questions to ask your healthcare team.    What infection is this antibiotic treating and how do you know I have that infection?    What side effects might occur from this antibiotic?    How long will I need to take this antibiotic?    Is it safe to take this antibiotic with other medications or supplements (e.g., vitamins) that I am taking?     Are there any special directions I need to know about taking this antibiotic? For example, should I take it with food?    How will I be monitored to know whether my infection is responding to the antibiotic?    What tests may help to make sure the right antibiotic is prescribed for me?      Information provided by:  www.cdc.gov/getsmart  U.S. Department of Health and Human Services  Centers for disease Control and Prevention  National Center for Emerging and Zoonotic Infectious Diseases  Division of Healthcare Quality Promotion             Medication List: This is a list of all your medications and when to take them. Check marks below indicate your daily home schedule. Keep this list as a reference.      Medications           Morning Afternoon Evening Bedtime As Needed    amoxicillin-clavulanate 875-125 MG per tablet   Commonly known as:  AUGMENTIN   Take 1 tablet by mouth every 12 hours   Last time this was given:  1 tablet on 3/27/2018  8:46 AM                                atazanavir-cobicistat 300-150 MG table   Commonly known as:  EVOTAZ   Take 1 tablet by mouth daily   Last time this was given:  1 tablet on 3/27/2018  8:47 AM                                benzocaine 20 % Gel gel   Commonly known as:  ORAJEL MAXIMUM STRENGTH   Take by mouth 4 times daily as needed for moderate pain                                chlorhexidine 0.12 % solution   Commonly known as:  PERIDEX   Swish  and spit 15 mLs in mouth 2 times daily   Last time this was given:  15 mLs on 3/27/2018  8:46 AM                                emtricitabine-tenofovir -25 MG per tablet   Commonly known as:  DESCOVY   Take 1 tablet by mouth daily   Last time this was given:  1 tablet on 3/27/2018  8:47 AM                                hydrOXYzine 50 MG tablet   Commonly known as:  ATARAX   Take 1 tablet (50 mg) by mouth 4 times daily as needed for itching or anxiety   Last time this was given:  50 mg on 3/24/2018  9:01 AM                                lamoTRIgine 100 MG 24 hr tablet   Commonly known as:  LaMICtal   Take 2 tablets (200 mg) by mouth daily   Start taking on:  3/28/2018   Last time this was given:  200 mg on 3/27/2018  8:46 AM                                nicotine polacrilex 4 MG lozenge   Place 1-2 lozenges (4-8 mg) inside cheek every hour as needed for other (nicotine withdrawal symptoms)   Last time this was given:  8 mg on 3/27/2018 12:29 PM                                propranolol 10 MG tablet   Commonly known as:  INDERAL   Take 1 tablet (10 mg) by mouth 3 times daily   Last time this was given:  10 mg on 3/27/2018  1:34 PM                                traZODone 100 MG tablet   Commonly known as:  DESYREL   Take 1 tablet (100 mg) by mouth nightly as needed for sleep   Last time this was given:  100 mg on 3/26/2018  8:21 PM                                venlafaxine 75 MG Tb24 24 hr tablet   Commonly known as:  EFFEXOR-ER   Take 1 tablet (75 mg) by mouth daily (with breakfast)   Start taking on:  3/28/2018   Last time this was given:  75 mg on 3/27/2018  8:46 AM

## 2018-03-18 NOTE — IP AVS SNAPSHOT
[address] [address]    Phone:  [phone]                                       After Visit Summary   3/18/2018    Tejinder Doyle    MRN: [id number]           After Visit Summary Signature Page     I have received my discharge instructions, and my questions have been answered. I have discussed any challenges I see with this plan with the nurse or doctor.    ..........................................................................................................................................  Patient/Patient Representative Signature      ..........................................................................................................................................  Patient Representative Print Name and Relationship to Patient    ..................................................               ................................................  Date                                            Time    ..........................................................................................................................................  Reviewed by Signature/Title    ...................................................              ..............................................  Date                                                            Time
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
## 2018-03-18 NOTE — PHARMACY-ADMISSION MEDICATION HISTORY
Admission Medication History status for the 3/18/2018 admission is complete.  See EPIC admission navigator for Prior to Admission medications.    Medication history sources:  Patient, [facility]     Medication history source reliability: Good. Patient willing to discuss medications and reports he uses the [facility]. Patient report aligns well with [facility] fill history records.    Medication adherence:  Poor. Patient reports he relapsed and was using drugs again so he was not able to take his medications in the last month. He also reports that he lost insurance.    Changes made to PTA medication list (reason)  Added:   -clonidine - per patient/[facility].  Deleted:   -Suboxone - no longer taking per patient/[facility].  -baclofen - not filled since May 2017 per [facility].  Changed:   -gabapentin: take 400mg by mouth 3 times daily --> gabapentin 300mg caps: take 1-3 caps by mouth every 6 hours as needed for anxiety per [facility].  -lamotrigine: take 100mg by mouth daily --> lamotrigine 100mg tab: take 200mg by mouth daily per [facility].    Additional medication history information (including reliability of information, actions taken by pharmacist):   -Patient reports he was previously on Suboxone 8-2mg tabs: dissolve 1 tablet under the tongue 2 times daily. Per [facility], patient last filled Suboxone 8-2mg tabs: dissolve 1/2 tab under the tongue once daily on 1/22/18 for 30 day supply.   -Per [facility], patient last filled a 30 day supply of Evotaz, Descovy, bupropion, lamotrigine, and gabapentin on 1/16/18. Patient reports he stopped taking all of these medications on 2/17/18 which indicates patient was likely adherent prior to relapsing.  -Per patient, Descovy and Evotaz were not covered by insurance last time he tried to fill them (may have been 3/1/18 per [facility]).   -Per patient, he was previously taking clonidine as needed which he stopped about a month ago. Per  [facility], clonidine last picked up on 9/22/17 for 60 tabs. Patient may have had excess supply if he was using it rarely.    Time spent in this activity: 20 minutes    Medication history completed by: Jocelyn Garg, [facility] Pharmacy Intern    Prior to Admission medications    Medication Sig Last Dose Taking? Auth Provider   buPROPion (WELLBUTRIN XL) 150 MG 24 hr tablet Take 150 mg by mouth every morning 2/17/2018 at Unknown time Yes Unknown, Entered By History   gabapentin (NEURONTIN) 300 MG capsule Take 1-3 capsules by mouth every 6 hours as needed for anxiety 2/17/2018 at Unknown time Yes Unknown, Entered By History   lamoTRIgine (LAMICTAL) 100 MG tablet Take 200 mg by mouth daily 2/17/2018 at Unknown time Yes Unknown, Entered By History   emtricitabine-tenofovir AF (DESCOVY) 200-25 MG per tablet Take 1 tablet by mouth daily 2/17/2018 at Unknown time Yes Melania Fried MD   atazanavir-cobicistat (EVOTAZ) 300-150 MG table Take 1 tablet by mouth daily 2/17/2018 at Unknown time Yes Melania Fried MD   cloNIDine (CATAPRES) 0.1 MG tablet Take 0.1 mg by mouth 3 times daily as needed (anxiety) More than a month at Unknown time  Unknown, Entered By History

## 2018-03-19 PROBLEM — F32.A DEPRESSION: Status: ACTIVE | Noted: 2018-03-19

## 2018-03-19 PROCEDURE — HZ2ZZZZ DETOXIFICATION SERVICES FOR SUBSTANCE ABUSE TREATMENT: ICD-10-PCS | Performed by: PSYCHIATRY & NEUROLOGY

## 2018-03-19 PROCEDURE — 25000132 ZZH RX MED GY IP 250 OP 250 PS 637: Performed by: EMERGENCY MEDICINE

## 2018-03-19 PROCEDURE — 12400007 ZZH R&B MH INTERMEDIATE UMMC

## 2018-03-19 PROCEDURE — 25000132 ZZH RX MED GY IP 250 OP 250 PS 637: Performed by: PSYCHIATRY & NEUROLOGY

## 2018-03-19 RX ORDER — ALUMINA, MAGNESIA, AND SIMETHICONE 2400; 2400; 240 MG/30ML; MG/30ML; MG/30ML
30 SUSPENSION ORAL EVERY 4 HOURS PRN
Status: DISCONTINUED | OUTPATIENT
Start: 2018-03-19 | End: 2018-03-27 | Stop reason: HOSPADM

## 2018-03-19 RX ORDER — GABAPENTIN 300 MG/1
600 CAPSULE ORAL 3 TIMES DAILY PRN
Status: DISCONTINUED | OUTPATIENT
Start: 2018-03-19 | End: 2018-03-20

## 2018-03-19 RX ORDER — OLANZAPINE 10 MG/1
10 TABLET ORAL
Status: DISCONTINUED | OUTPATIENT
Start: 2018-03-19 | End: 2018-03-27 | Stop reason: HOSPADM

## 2018-03-19 RX ORDER — TRAZODONE HYDROCHLORIDE 50 MG/1
50 TABLET, FILM COATED ORAL
Status: DISCONTINUED | OUTPATIENT
Start: 2018-03-19 | End: 2018-03-26

## 2018-03-19 RX ORDER — BISACODYL 10 MG
10 SUPPOSITORY, RECTAL RECTAL DAILY PRN
Status: DISCONTINUED | OUTPATIENT
Start: 2018-03-19 | End: 2018-03-27 | Stop reason: HOSPADM

## 2018-03-19 RX ORDER — LAMOTRIGINE 100 MG/1
200 TABLET, EXTENDED RELEASE ORAL DAILY
Status: DISCONTINUED | OUTPATIENT
Start: 2018-03-19 | End: 2018-03-19

## 2018-03-19 RX ORDER — HYDROXYZINE HYDROCHLORIDE 25 MG/1
25 TABLET, FILM COATED ORAL EVERY 4 HOURS PRN
Status: DISCONTINUED | OUTPATIENT
Start: 2018-03-19 | End: 2018-03-20

## 2018-03-19 RX ORDER — CLONIDINE HYDROCHLORIDE 0.1 MG/1
0.1 TABLET ORAL ONCE
Status: COMPLETED | OUTPATIENT
Start: 2018-03-19 | End: 2018-03-19

## 2018-03-19 RX ORDER — ACETAMINOPHEN 325 MG/1
650 TABLET ORAL EVERY 4 HOURS PRN
Status: DISCONTINUED | OUTPATIENT
Start: 2018-03-19 | End: 2018-03-27 | Stop reason: HOSPADM

## 2018-03-19 RX ORDER — OLANZAPINE 10 MG/2ML
10 INJECTION, POWDER, FOR SOLUTION INTRAMUSCULAR
Status: DISCONTINUED | OUTPATIENT
Start: 2018-03-19 | End: 2018-03-27 | Stop reason: HOSPADM

## 2018-03-19 RX ORDER — BUPROPION HYDROCHLORIDE 150 MG/1
150 TABLET ORAL DAILY
Status: DISCONTINUED | OUTPATIENT
Start: 2018-03-19 | End: 2018-03-20

## 2018-03-19 RX ORDER — CLONIDINE HYDROCHLORIDE 0.1 MG/1
0.1 TABLET ORAL 3 TIMES DAILY PRN
Status: DISCONTINUED | OUTPATIENT
Start: 2018-03-19 | End: 2018-03-20

## 2018-03-19 RX ORDER — DIAZEPAM 5 MG
5-20 TABLET ORAL EVERY 30 MIN PRN
Status: DISCONTINUED | OUTPATIENT
Start: 2018-03-19 | End: 2018-03-21

## 2018-03-19 RX ORDER — GABAPENTIN 300 MG/1
300 CAPSULE ORAL ONCE
Status: COMPLETED | OUTPATIENT
Start: 2018-03-19 | End: 2018-03-19

## 2018-03-19 RX ORDER — LAMOTRIGINE 100 MG/1
200 TABLET, EXTENDED RELEASE ORAL DAILY
Status: DISCONTINUED | OUTPATIENT
Start: 2018-03-20 | End: 2018-03-27 | Stop reason: HOSPADM

## 2018-03-19 RX ADMIN — GABAPENTIN 600 MG: 300 CAPSULE ORAL at 17:49

## 2018-03-19 RX ADMIN — CLONIDINE HYDROCHLORIDE 0.1 MG: 0.1 TABLET ORAL at 12:26

## 2018-03-19 RX ADMIN — ATAZANAVIR AND COBICISTAT 1 TABLET: 300; 150 TABLET ORAL at 08:45

## 2018-03-19 RX ADMIN — NICOTINE POLACRILEX 8 MG: 4 LOZENGE ORAL at 20:49

## 2018-03-19 RX ADMIN — GABAPENTIN 300 MG: 300 CAPSULE ORAL at 12:26

## 2018-03-19 RX ADMIN — LAMOTRIGINE 200 MG: 100 TABLET, FILM COATED, EXTENDED RELEASE ORAL at 08:45

## 2018-03-19 RX ADMIN — TRAZODONE HYDROCHLORIDE 50 MG: 50 TABLET ORAL at 21:36

## 2018-03-19 RX ADMIN — DIAZEPAM 10 MG: 5 TABLET ORAL at 20:49

## 2018-03-19 RX ADMIN — BUPROPION HYDROCHLORIDE 150 MG: 150 TABLET, FILM COATED, EXTENDED RELEASE ORAL at 08:45

## 2018-03-19 RX ADMIN — EMTRICITABINE AND TENOFOVIR ALAFENAMIDE 1 TABLET: 200; 25 TABLET ORAL at 08:45

## 2018-03-19 ASSESSMENT — ACTIVITIES OF DAILY LIVING (ADL)
ORAL_HYGIENE: INDEPENDENT
DRESS: INDEPENDENT
GROOMING: INDEPENDENT

## 2018-03-19 NOTE — ED NOTES
ED to Behavioral Floor Handoff    SITUATION  Tejinder Doyle is a 32 year old male who speaks English and lives is homeless with others The patient arrived in the ED by private car from home with a complaint of Suicidal (relapsed on 2/17 on heroin, benzos, alcohol and meth and this has caused the SI.  Plan to OD. did attempt suicide about a year ago by overdosing on benzos and alcohol and a friend brought him in.)  .The patient's current symptoms started/worsened 1 week(s) ago and during this time the symptoms have increased.   In the ED, pt was diagnosed with   Final diagnoses:   Chemical dependency (H)   Polysubstance abuse   Substance induced mood disorder (H)   Suicidal ideation        Initial vitals were: BP: 133/86  Pulse: 122  Heart Rate: 87  Temp: 97.8  F (36.6  C)  Resp: 18  Weight: 71.2 kg (157 lb)  SpO2: 98 %   --------  Is the patient diabetic? No   If yes, last blood glucose? --     If yes, was this treated in the ED? --  --------  Is the patient inebriated (ETOH) No or Impaired on other substances? No  MSSA done? Yes  Last MSSA score: 7    Were withdrawal symptoms treated? Yes  Does the patient have a seizure history? Yes. If yes, date of most recent seizure--  --------  Is the patient patient experiencing suicidal ideation? reports the following suicide factors: suicidal with plan to OD on heroin; history of OD.    Homicidal ideation? denies current or recent homicidal ideation or behaviors.    Self-injurious behavior/urges? denies current or recent self injurious behavior or ideation.  ------  Was pt aggressive in the ED No  Was a code called No  Is the pt now cooperative? Yes  -------  Meds given in ED:   Medications   atazanavir-cobicistat (EVOTAZ) 300-150 MG tablet TABS 1 tablet (1 tablet Oral Given 3/19/18 0845)   emtricitabine-tenofovir AF (DESCOVY) 200-25 MG per tablet 1 tablet (1 tablet Oral Given 3/19/18 0845)   buPROPion (WELLBUTRIN XL) 24 hr tablet 150 mg (150 mg Oral Given 3/19/18 0845)    lamoTRIgine (LaMICtal) 24 hr tablet 200 mg (200 mg Oral Given 3/19/18 1730)   cloNIDine (CATAPRES) tablet 0.1 mg (0.1 mg Oral Given 3/19/18 1226)   gabapentin (NEURONTIN) capsule 300 mg (300 mg Oral Given 3/19/18 1226)      Family present during ED course? No  Family currently present? No    BACKGROUND  Does the patient have a cognitive impairment or developmental disability? No  Allergies:   Allergies   Allergen Reactions     Sulfa Drugs    .   Social demographics are   Social History     Social History     Marital status: Single     Spouse name: N/A     Number of children: N/A     Years of education: N/A     Social History Main Topics     Smoking status: Current Every Day Smoker     Packs/day: 1.00     Smokeless tobacco: Never Used     Alcohol use Yes      Comment: daily - 1 Liter vodka daily as of 3/18/18     Drug use: Yes     Special: Methamphetamines, IV, Opiates, Benzodiazepines      Comment: xanax, klonipin, meth, heroin,      Sexual activity: Yes     Partners: Male     Other Topics Concern     None     Social History Narrative        ASSESSMENT  Labs results   Labs Ordered and Resulted from Time of ED Arrival Up to the Time of Departure from the ED   DRUG ABUSE SCREEN 6 CHEM DEP URINE ([facility]) - Abnormal; Notable for the following:        Result Value    Amphetamine Qual Urine Positive (*)     Benzodiazepine Qual Urine Positive (*)     All other components within normal limits      Imaging Studies: No results found for this or any previous visit (from the past 24 hour(s)).   Most recent vital signs /73  Pulse 96  Temp 97.8  F (36.6  C) (Oral)  Resp 16  Wt 71.2 kg (157 lb)  SpO2 96%  BMI 23.87 kg/m2   Abnormal labs/tests/findings requiring intervention:---   Pain control: good  Nausea control: good    RECOMMENDATION  Are any infection precautions needed (MRSA, VRE, etc.)? No If yes, what infection? --  ---  Does the patient have mobility issues? independently. If yes, what device does the pt use?  ---  ---  Is patient on 72 hour hold or commitment? No If on 72 hour hold, have hold and rights been given to patient? N/A  Are admitting orders written if after 10 p.m. ?N/A  Tasks needing to be completed:---     Edson Dumont-- [phone] [facility]   [phone] [facility]

## 2018-03-19 NOTE — ED NOTES
Patient signed out to me by my partner pending psychiatric bed assignment.  No acute issues throughout my shift.  Patient will be signed out to my partner.     Riccardo Price MD  03/19/18 [id number]

## 2018-03-19 NOTE — ED NOTES
Patient reports history of alcohol withdrawal seizures, reports his last seizure was approximately 1 year ago.

## 2018-03-19 NOTE — ED NOTES
31 yo M who presented with SI.  No acute events overnight.  Tejinder is awaiting admission to psychiatry.     Josef Pena MD  03/19/18 0779

## 2018-03-20 LAB
ALBUMIN SERPL-MCNC: 3.3 G/DL (ref 3.4–5)
ALP SERPL-CCNC: 84 U/L (ref 40–150)
ALT SERPL W P-5'-P-CCNC: 26 U/L (ref 0–70)
ANION GAP SERPL CALCULATED.3IONS-SCNC: 9 MMOL/L (ref 3–14)
AST SERPL W P-5'-P-CCNC: 14 U/L (ref 0–45)
BASOPHILS # BLD AUTO: 0 10E9/L (ref 0–0.2)
BASOPHILS NFR BLD AUTO: 0.3 %
BILIRUB SERPL-MCNC: 2 MG/DL (ref 0.2–1.3)
BUN SERPL-MCNC: 13 MG/DL (ref 7–30)
CALCIUM SERPL-MCNC: 8.2 MG/DL (ref 8.5–10.1)
CHLORIDE SERPL-SCNC: 109 MMOL/L (ref 94–109)
CHOLEST SERPL-MCNC: 93 MG/DL
CO2 SERPL-SCNC: 26 MMOL/L (ref 20–32)
CREAT SERPL-MCNC: 0.69 MG/DL (ref 0.66–1.25)
DIFFERENTIAL METHOD BLD: NORMAL
EOSINOPHIL # BLD AUTO: 0.1 10E9/L (ref 0–0.7)
EOSINOPHIL NFR BLD AUTO: 2 %
ERYTHROCYTE [DISTWIDTH] IN BLOOD BY AUTOMATED COUNT: 12.1 % (ref 10–15)
GFR SERPL CREATININE-BSD FRML MDRD: >90 ML/MIN/1.7M2
GLUCOSE SERPL-MCNC: 93 MG/DL (ref 70–99)
HCT VFR BLD AUTO: 45.5 % (ref 40–53)
HDLC SERPL-MCNC: 34 MG/DL
HGB BLD-MCNC: 15.4 G/DL (ref 13.3–17.7)
IMM GRANULOCYTES # BLD: 0 10E9/L (ref 0–0.4)
IMM GRANULOCYTES NFR BLD: 0.2 %
LDLC SERPL CALC-MCNC: 43 MG/DL
LYMPHOCYTES # BLD AUTO: 2.6 10E9/L (ref 0.8–5.3)
LYMPHOCYTES NFR BLD AUTO: 42 %
MCH RBC QN AUTO: 31.1 PG (ref 26.5–33)
MCHC RBC AUTO-ENTMCNC: 33.8 G/DL (ref 31.5–36.5)
MCV RBC AUTO: 92 FL (ref 78–100)
MONOCYTES # BLD AUTO: 0.6 10E9/L (ref 0–1.3)
MONOCYTES NFR BLD AUTO: 10.2 %
NEUTROPHILS # BLD AUTO: 2.8 10E9/L (ref 1.6–8.3)
NEUTROPHILS NFR BLD AUTO: 45.3 %
NONHDLC SERPL-MCNC: 59 MG/DL
NRBC # BLD AUTO: 0 10*3/UL
NRBC BLD AUTO-RTO: 0 /100
PLATELET # BLD AUTO: 230 10E9/L (ref 150–450)
POTASSIUM SERPL-SCNC: 4.1 MMOL/L (ref 3.4–5.3)
PROT SERPL-MCNC: 6.6 G/DL (ref 6.8–8.8)
RBC # BLD AUTO: 4.95 10E12/L (ref 4.4–5.9)
SODIUM SERPL-SCNC: 144 MMOL/L (ref 133–144)
TRIGL SERPL-MCNC: 80 MG/DL
TSH SERPL DL<=0.005 MIU/L-ACNC: 1.38 MU/L (ref 0.4–4)
VIT B12 SERPL-MCNC: 636 PG/ML (ref 193–986)
WBC # BLD AUTO: 6.1 10E9/L (ref 4–11)

## 2018-03-20 PROCEDURE — 80061 LIPID PANEL: CPT | Performed by: PSYCHIATRY & NEUROLOGY

## 2018-03-20 PROCEDURE — 80053 COMPREHEN METABOLIC PANEL: CPT | Performed by: PSYCHIATRY & NEUROLOGY

## 2018-03-20 PROCEDURE — 84443 ASSAY THYROID STIM HORMONE: CPT | Performed by: PSYCHIATRY & NEUROLOGY

## 2018-03-20 PROCEDURE — 85025 COMPLETE CBC W/AUTO DIFF WBC: CPT | Performed by: PSYCHIATRY & NEUROLOGY

## 2018-03-20 PROCEDURE — 99223 1ST HOSP IP/OBS HIGH 75: CPT | Mod: AI | Performed by: PSYCHIATRY & NEUROLOGY

## 2018-03-20 PROCEDURE — 25000132 ZZH RX MED GY IP 250 OP 250 PS 637: Performed by: PSYCHIATRY & NEUROLOGY

## 2018-03-20 PROCEDURE — 36415 COLL VENOUS BLD VENIPUNCTURE: CPT | Performed by: PSYCHIATRY & NEUROLOGY

## 2018-03-20 PROCEDURE — 82607 VITAMIN B-12: CPT | Performed by: PSYCHIATRY & NEUROLOGY

## 2018-03-20 PROCEDURE — 12400007 ZZH R&B MH INTERMEDIATE UMMC

## 2018-03-20 PROCEDURE — 25000132 ZZH RX MED GY IP 250 OP 250 PS 637: Performed by: EMERGENCY MEDICINE

## 2018-03-20 RX ORDER — VENLAFAXINE HYDROCHLORIDE 37.5 MG/1
37.5 TABLET, EXTENDED RELEASE ORAL
Status: DISCONTINUED | OUTPATIENT
Start: 2018-03-21 | End: 2018-03-20

## 2018-03-20 RX ORDER — PROPRANOLOL HCL 10 MG
5 TABLET ORAL 3 TIMES DAILY
Status: DISCONTINUED | OUTPATIENT
Start: 2018-03-20 | End: 2018-03-21

## 2018-03-20 RX ORDER — VENLAFAXINE HYDROCHLORIDE 37.5 MG/1
37.5 TABLET, EXTENDED RELEASE ORAL
Status: DISCONTINUED | OUTPATIENT
Start: 2018-03-20 | End: 2018-03-22

## 2018-03-20 RX ADMIN — LAMOTRIGINE 200 MG: 100 TABLET, FILM COATED, EXTENDED RELEASE ORAL at 08:34

## 2018-03-20 RX ADMIN — GABAPENTIN 600 MG: 300 CAPSULE ORAL at 08:34

## 2018-03-20 RX ADMIN — Medication 5 MG: at 16:31

## 2018-03-20 RX ADMIN — CLONIDINE HYDROCHLORIDE 0.1 MG: 0.1 TABLET ORAL at 08:34

## 2018-03-20 RX ADMIN — ALUMINUM HYDROXIDE, MAGNESIUM HYDROXIDE, AND DIMETHICONE 30 ML: 400; 400; 40 SUSPENSION ORAL at 20:27

## 2018-03-20 RX ADMIN — BUPROPION HYDROCHLORIDE 150 MG: 150 TABLET, FILM COATED, EXTENDED RELEASE ORAL at 08:35

## 2018-03-20 RX ADMIN — DIAZEPAM 10 MG: 5 TABLET ORAL at 04:57

## 2018-03-20 RX ADMIN — OLANZAPINE 10 MG: 10 TABLET, FILM COATED ORAL at 18:11

## 2018-03-20 RX ADMIN — DIAZEPAM 5 MG: 5 TABLET ORAL at 08:34

## 2018-03-20 RX ADMIN — TRAZODONE HYDROCHLORIDE 50 MG: 50 TABLET ORAL at 20:27

## 2018-03-20 RX ADMIN — VENLAFAXINE HYDROCHLORIDE 37.5 MG: 37.5 TABLET, EXTENDED RELEASE ORAL at 16:31

## 2018-03-20 RX ADMIN — DIAZEPAM 5 MG: 5 TABLET ORAL at 12:50

## 2018-03-20 RX ADMIN — Medication 5 MG: at 20:27

## 2018-03-20 RX ADMIN — ATAZANAVIR AND COBICISTAT 1 TABLET: 300; 150 TABLET ORAL at 08:34

## 2018-03-20 RX ADMIN — EMTRICITABINE AND TENOFOVIR ALAFENAMIDE 1 TABLET: 200; 25 TABLET ORAL at 08:34

## 2018-03-20 ASSESSMENT — ACTIVITIES OF DAILY LIVING (ADL)
ORAL_HYGIENE: INDEPENDENT
DRESS: STREET CLOTHES
LAUNDRY: WITH SUPERVISION
GROOMING: INDEPENDENT

## 2018-03-20 NOTE — PROGRESS NOTES
Pt has been in his room 90% of the shift but did come out for meals and asked help to do his laundry. Pt's affect is mostly still blunted but he is brightening up a bit. Pt denies SI and SIB thoughts. Pleasant and cooperative. No groups. ADLs need improvement.     03/20/18 1434   Behavioral Health   Hallucinations denies / not responding to hallucinations   Thinking poor concentration   Orientation person: oriented;place: oriented;date: oriented;time: oriented   Memory baseline memory   Insight poor   Judgement impaired   Eye Contact at examiner   Affect (neutral)   Mood mood is calm   Physical Appearance/Attire appears stated age;attire appropriate to age and situation   Hygiene (fair)   Suicidality (denies)   1. Wish to be Dead No   2. Non-Specific Active Suicidal Thoughts  No   Self Injury (denies)   Elopement (no signs of eloping)   Activity isolative;withdrawn   Speech clear;coherent   Medication Sensitivity no stated side effects;no observed side effects   Psychomotor / Gait balanced;steady   Coping/Psychosocial   Verbalized Emotional State disbelief;guilt   Psycho Education   Type of Intervention 1:1 intervention   Response unavailable   Hours 0.5   Treatment Detail ([facility])

## 2018-03-20 NOTE — PLAN OF CARE
Problem: Patient Care Overview  Goal: Team Discussion  Team Plan:   BEHAVIORAL TEAM DISCUSSION    Participants: Dr. Francis Paredes MD, Margareth BARRAGAN and Babs CHOU RN   Progress: Initial behavioral team discussion.   Continued Stay Criteria/Rationale: Pt admitted voluntary due to substance use and suicidal ideations.   Medical/Physical: See medical consult, if applicable.   Precautions:   Behavioral Orders   Procedures     Code 1 - Restrict to Unit     Routine Programming     As clinically indicated     Seizure precautions     Status 15     Every 15 minutes.     Suicide precautions     Withdrawal precautions     Plan: The plan is to assess the patient for mental health and medication needs.  The patient will be prescribed medications to treat the identified symptoms.  Upon discharge the patient will be referred to services as appropriate based on the assessment.  Rationale for change in precautions or plan: No change, initial plan.

## 2018-03-20 NOTE — PROGRESS NOTES
MSSA monitored through night, scores 4 and 9. Pt initially denied sx of withdrawal, HR 91, reporting only being tired. One second check pt reported some anxiety, slight tremor noted, and HR 93. Valium 10mg given x1.
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
## 2018-03-20 NOTE — PLAN OF CARE
Problem: Patient Care Overview  Goal: Plan of Care/Patient Progress Review  Personal Plan of Care    Reasons you are in the Hospital  1. Hurt self outside of here   2. Suicidal   3. Almost a month drug binge   4.    Goals for Discharge   1. Would like to stop using drugs   2. Would like to go to [facility] treatment   3. Would like to go from here into treatment
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
## 2018-03-20 NOTE — PROGRESS NOTES
03/19/18 1933   Patient Belongings   Patient Belongings clothing;shoes   Disposition of Belongings Locker   Belongings Search Yes   Clothing Search Yes   Second Staff ALEXA   General Info Comment 4 BAGS OF CLOTHING.  HEADSET AND    A               Admission:  I am responsible for any personal items that are not sent to the safe or pharmacy.  [facility] is not responsible for loss, theft or damage of any property in my possession.    Signature:  _________________________________ Date: _______  Time: _____                                              Staff Signature:  ____________________________ Date: ________  Time: _____      2nd Staff person, if patient is unable/unwilling to sign:    Signature: ________________________________ Date: ________  Time: _____     Discharge:  [facility] has returned all of my personal belongings:    Signature: _________________________________ Date: ________  Time: _____                                          Staff Signature:  ____________________________ Date: ________  Time: _____

## 2018-03-21 PROCEDURE — 25000132 ZZH RX MED GY IP 250 OP 250 PS 637: Performed by: PSYCHIATRY & NEUROLOGY

## 2018-03-21 PROCEDURE — 99232 SBSQ HOSP IP/OBS MODERATE 35: CPT | Performed by: PSYCHIATRY & NEUROLOGY

## 2018-03-21 PROCEDURE — 12400007 ZZH R&B MH INTERMEDIATE UMMC

## 2018-03-21 RX ORDER — POLYETHYLENE GLYCOL 3350 17 G/17G
17 POWDER, FOR SOLUTION ORAL DAILY PRN
Status: DISCONTINUED | OUTPATIENT
Start: 2018-03-21 | End: 2018-03-27 | Stop reason: HOSPADM

## 2018-03-21 RX ORDER — PROPRANOLOL HYDROCHLORIDE 10 MG/1
10 TABLET ORAL 3 TIMES DAILY
Status: DISCONTINUED | OUTPATIENT
Start: 2018-03-21 | End: 2018-03-27 | Stop reason: HOSPADM

## 2018-03-21 RX ADMIN — DIAZEPAM 5 MG: 5 TABLET ORAL at 08:21

## 2018-03-21 RX ADMIN — OLANZAPINE 10 MG: 10 TABLET, FILM COATED ORAL at 13:22

## 2018-03-21 RX ADMIN — EMTRICITABINE AND TENOFOVIR ALAFENAMIDE 1 TABLET: 200; 25 TABLET ORAL at 08:18

## 2018-03-21 RX ADMIN — PROPRANOLOL HYDROCHLORIDE 10 MG: 10 TABLET ORAL at 13:22

## 2018-03-21 RX ADMIN — PROPRANOLOL HYDROCHLORIDE 10 MG: 10 TABLET ORAL at 21:03

## 2018-03-21 RX ADMIN — ATAZANAVIR AND COBICISTAT 1 TABLET: 300; 150 TABLET ORAL at 08:18

## 2018-03-21 RX ADMIN — Medication 5 MG: at 08:18

## 2018-03-21 RX ADMIN — VENLAFAXINE HYDROCHLORIDE 37.5 MG: 37.5 TABLET, EXTENDED RELEASE ORAL at 08:18

## 2018-03-21 RX ADMIN — TRAZODONE HYDROCHLORIDE 50 MG: 50 TABLET ORAL at 21:03

## 2018-03-21 RX ADMIN — LAMOTRIGINE 200 MG: 100 TABLET, FILM COATED, EXTENDED RELEASE ORAL at 08:18

## 2018-03-21 ASSESSMENT — ACTIVITIES OF DAILY LIVING (ADL)
ORAL_HYGIENE: INDEPENDENT
DRESS: INDEPENDENT
GROOMING: INDEPENDENT
DRESS: INDEPENDENT
ORAL_HYGIENE: INDEPENDENT
GROOMING: INDEPENDENT
LAUNDRY: WITH SUPERVISION

## 2018-03-21 NOTE — PLAN OF CARE
"Problem: Depressive Symptoms  Goal: Depressive Symptoms  Patient will remain safe without any self harm during hospitalization.  Patient will have decreased thoughts of self harm and/or suicidal ideation  Patient will not have episodes of aggressive behavior  Patient will report improved sleep and feeling rested upon waking.  Patient will have adequate daily oral intake.   Patient will have improvement in self-care, including personal hygiene.   Patient will verbalize and demonstrate an ability to cope for their age.   Patient will be able to participate and initiate activities and conversation with others.   Patient will discuss feelings of hopelessness and express an interest in the future.   By discharge the patient will report an increase in sense of control over their current situation.(i.e by verbalizing coping techniques to help get their life back on track and/or naming people they can talk to when they need emotional assistance). Signs and symptoms of listed problems will be absent or manageable.   Outcome: No Change  48 hour nursing assessment:    Patient has been isolative in room most of the shift. He states he is in the hospital because of \"self harm\". His goal was to meet with the doctor today and this was met. He denies SI and SIB. He denies AH. He endorses having racing thoughts. He does not know if his medications are working. He states he feels safe here and he is feeling hopeful. He states his coping skills are \"sleeping\". He rates his anxiety and depression a 6. He denies pain. His sleep and appetite are good. He did not go to any groups today. His affect is flat.       "

## 2018-03-21 NOTE — PROGRESS NOTES
"[facility], [place]   Psychiatric Progress Note  Tejinder Doyle  [id number]  03/21/18    Chief Complaint: Continued medical care          Interim History:   The patient's care was discussed with the treatment team during the daily team meeting and/or staff's chart notes were reviewed.  Staff report patient has been doing well on the unit not going to groups and slept about 6 hours.    Upon meeting with him he reports feeling tired and sleeping well overnight.  Denies any side effects related to medications were restarted and his anxiety is up and down he is not sure if the propranolol has done much.  He has not had any problems with taking the medications that he has been prescribed and is sometimes craving methamphetamine.  Denies any paranoia or hallucinations or any thoughts of harming himself or others and is looking forward to going to treatment.         Medications:       propranolol  10 mg Oral TID     venlafaxine  37.5 mg Oral Daily with breakfast     atazanavir-cobicistat  1 tablet Oral Daily     lamoTRIgine  200 mg Oral Daily     emtricitabine-tenofovir AF  1 tablet Oral Daily          Allergies:     Allergies   Allergen Reactions     Sulfa Drugs           Labs:   No results found for this or any previous visit (from the past 24 hour(s)).       Psychiatric Examination:     /74  Pulse 82  Temp 96.5  F (35.8  C) (Oral)  Resp 16  Ht 1.727 m (5' 8\")  Wt 70.5 kg (155 lb 6.4 oz)  SpO2 95%  BMI 23.63 kg/m2  Weight is 155 lbs 6.4 oz  Body mass index is 23.63 kg/(m^2).                Sitting Orthostatic BP: 120/73      Sitting Orthostatic Pulse: 87 bpm      Standing Orthostatic BP: 106/78      Standing Orthostatic Pulse: 97 bpm       Weight over time:  Vitals:    03/18/18 1323 03/19/18 1921 03/20/18 0815   Weight: 71.2 kg (157 lb) 70.3 kg (155 lb) 70.5 kg (155 lb 6.4 oz)       Orthostatic Vitals       Most Recent      Sitting Orthostatic /73 03/21 0808    Sitting " Orthostatic Pulse (bpm) 87 03/21 0808    Standing Orthostatic /78 03/21 0808    Standing Orthostatic Pulse (bpm) 97 03/21 0808            Tejinder is a 32-year-old male wearing hospital scrubs.  His speech is of an appropriate rate and tone in his language is intact.  His behavior is appropriate and he does not have any abnormal movements.  His affect is neutral.  His mood he describes as anxious.  His thought content consists of the above without thoughts of harming himself or others or current delusions.  His thought process is ruminative without looseness of association.  He does not have any abnormal perceptions.  He is alert and aware of his current location and circumstances.  His attention and concentration appears adequate.  His cognition and fund of knowledge appears intact.  His long-term/short-term/remote memory appears intact.  His insight and judgment are both limited.         Precautions:     Behavioral Orders   Procedures     Code 1 - Restrict to Unit     Routine Programming     As clinically indicated     Seizure precautions     Status 15     Every 15 minutes.     Suicide precautions     Withdrawal precautions          DIagnoses:     Generalized anxiety disorder  Alcohol use disorder severe  Methamphetamine use disorder severe  Heroin use disorder severe  Tobacco use disorder   Rule out ADHD versus bipolar 2         Assessment & Plan:     Tejinder has a long history of substance use and impulsivity that could be related to underlying ADHD.  There is also the possibility that he has bipolar 2 illness I am more convinced that it is likely ADHD.  He does have generalized anxiety and we are attempting to manage that with propranolol and venlafaxine that he just recently started.  The plan is to transition him to a chemical dependency treatment program at discharge.    Increasing propranolol to 10 mg 3 times a day for anxiety  Continue voluntary hospitalization  Conducting a rule 25 assessment with chemical  dependency treatment at discharge    The risks, benefits, alternatives and side effects have been discussed and are understood by the patient and other caregivers.      Francis Toth  [facility] Psychiatry      The following document has been created with voice recognition software and may contain unintentional word substitutions.    Non clinically relevant CMS requirements:  Clinical Global Impressions  First:  Considering your total clinical experience with this particular patient population, how severe are the patient's symptoms at this time?: 6 (03/20/18 2131)  Compared to the patient's condition at the START of treatment, this patient's condition is:: 4 (03/20/18 2131)  Most recent:  Considering your total clinical experience with this particular patient population, how severe are the patient's symptoms at this time?: 6 (03/20/18 2131)  Compared to the patient's condition at the START of treatment, this patient's condition is:: 4 (03/20/18 2131)    # Pain Assessment:   Current Pain Score 3/21/2018 3/20/2018 3/19/2018   Patient currently in pain? no no denies   Pain location - - -   Pain descriptors - - -       Any incidence of pain both chronic or acute reported will be documented in above documentation though further documentation can also be found in the internal medicine documentation or pain specialist documentation.

## 2018-03-21 NOTE — PROGRESS NOTES
Writer provided Pt with paperwork for Rule 25.     LVM for [facility] in [facility] asked for him to review Pt chart to see if appropriate for LP.

## 2018-03-21 NOTE — PROGRESS NOTES
03/20/18 2051   Behavioral Health   Hallucinations denies / not responding to hallucinations   Thinking poor concentration   Orientation time: oriented;date: oriented;place: oriented   Memory baseline memory   Insight insight appropriate to events   Judgement impaired   Eye Contact at examiner   Affect blunted, flat   Mood mood is calm   Physical Appearance/Attire attire appropriate to age and situation   Hygiene neglected grooming - unclean body, hair, teeth   Suicidality (denied )   1. Wish to be Dead No   2. Non-Specific Active Suicidal Thoughts  No   Self Injury (denied )   Elopement (denied )   Activity isolative;withdrawn   Speech coherent;clear   Medication Sensitivity no stated side effects   Psychomotor / Gait balanced;steady   Psycho Education   Type of Intervention 1:1 intervention   Response participates, initiates socially appropriate   Hours 0.5   Activities of Daily Living   Hygiene/Grooming independent   Oral Hygiene independent   Dress street clothes   Laundry with supervision   Room Organization independent   Activity   Activity Assistance Provided independent   Behavioral Health Interventions   Depression maintain safety precautions;maintain safe secure environment;monitor need to revise level of observation;encourage participation / independence with adls   Social and Therapeutic Interventions (Depression) encourage participation in therapeutic groups and milieu activities;encourage effective boundaries with peers;encourage socialization with peers     Pt. Was calm and cooperative. Pt. Spent the majority part of the shift in his room. Pt. Denied any paranoid thoughts and psychotic symptoms. Pt. Said he is depressed and having anxiety. Pt's appetite was good and sleeping and napping well.
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
## 2018-03-21 NOTE — PROGRESS NOTES
"   03/21/18 6345   Significant Event   Significant Event Other (see comments)  (shift summary)     Pt was isolative during the shift, sleeping/napping in his room.  Stated that, \"he's depressed, sad, anxious, and irritable.\"  Denies SI, SIB and hallucinations.  "

## 2018-03-22 PROCEDURE — 99232 SBSQ HOSP IP/OBS MODERATE 35: CPT | Performed by: PSYCHIATRY & NEUROLOGY

## 2018-03-22 PROCEDURE — 12400007 ZZH R&B MH INTERMEDIATE UMMC

## 2018-03-22 PROCEDURE — 25000132 ZZH RX MED GY IP 250 OP 250 PS 637: Performed by: PSYCHIATRY & NEUROLOGY

## 2018-03-22 RX ORDER — VENLAFAXINE HYDROCHLORIDE 75 MG/1
75 TABLET, EXTENDED RELEASE ORAL
Status: DISCONTINUED | OUTPATIENT
Start: 2018-03-23 | End: 2018-03-27 | Stop reason: HOSPADM

## 2018-03-22 RX ADMIN — PROPRANOLOL HYDROCHLORIDE 10 MG: 10 TABLET ORAL at 13:09

## 2018-03-22 RX ADMIN — ATAZANAVIR AND COBICISTAT 1 TABLET: 300; 150 TABLET ORAL at 08:34

## 2018-03-22 RX ADMIN — OLANZAPINE 10 MG: 10 TABLET, FILM COATED ORAL at 11:10

## 2018-03-22 RX ADMIN — LAMOTRIGINE 200 MG: 100 TABLET, FILM COATED, EXTENDED RELEASE ORAL at 08:34

## 2018-03-22 RX ADMIN — VENLAFAXINE HYDROCHLORIDE 37.5 MG: 37.5 TABLET, EXTENDED RELEASE ORAL at 08:34

## 2018-03-22 RX ADMIN — PROPRANOLOL HYDROCHLORIDE 10 MG: 10 TABLET ORAL at 20:39

## 2018-03-22 RX ADMIN — TRAZODONE HYDROCHLORIDE 50 MG: 50 TABLET ORAL at 20:39

## 2018-03-22 RX ADMIN — OLANZAPINE 10 MG: 10 TABLET, FILM COATED ORAL at 20:39

## 2018-03-22 RX ADMIN — PROPRANOLOL HYDROCHLORIDE 10 MG: 10 TABLET ORAL at 08:34

## 2018-03-22 RX ADMIN — EMTRICITABINE AND TENOFOVIR ALAFENAMIDE 1 TABLET: 200; 25 TABLET ORAL at 08:34

## 2018-03-22 ASSESSMENT — ACTIVITIES OF DAILY LIVING (ADL)
LAUNDRY: UNABLE TO COMPLETE
GROOMING: INDEPENDENT
ORAL_HYGIENE: INDEPENDENT
ORAL_HYGIENE: INDEPENDENT
GROOMING: INDEPENDENT;HANDWASHING
DRESS: SCRUBS (BEHAVIORAL HEALTH)
LAUNDRY: WITH SUPERVISION
DRESS: SCRUBS (BEHAVIORAL HEALTH)

## 2018-03-22 NOTE — PROGRESS NOTES
"[facility], [place]   Psychiatric Progress Note  Tejinder Doyle  [id number]  03/[birth year]18    Chief Complaint: Continued medical care          Interim History:   The patient's care was discussed with the treatment team during the daily team meeting and/or staff's chart notes were reviewed.  Staff report patient needs to complete his Rule 25 paperwork.     The patient reports that he is \"not feeling so good.\" Mood is \"low.\" Did get a little sleep and is eating well. Denies SI or HI. Agreeable to an increase in Effexor. Plans to enter inpatient CD treatment.          Medications:       [START ON 3/23/2018] venlafaxine  75 mg Oral Daily with breakfast     propranolol  10 mg Oral TID     atazanavir-cobicistat  1 tablet Oral Daily     lamoTRIgine  200 mg Oral Daily     emtricitabine-tenofovir AF  1 tablet Oral Daily          Allergies:     Allergies   Allergen Reactions     Sulfa Drugs           Labs:   No results found for this or any previous visit (from the past 24 hour(s)).       Psychiatric Examination:     /60  Pulse 85  Temp 97.1  F (36.2  C) (Tympanic)  Resp 16  Ht 1.727 m (5' 8\")  Wt 70.5 kg (155 lb 6.4 oz)  SpO2 95%  BMI 23.63 kg/m2  Weight is 155 lbs 6.4 oz  Body mass index is 23.63 kg/(m^2).                Sitting Orthostatic BP: 120/73      Sitting Orthostatic Pulse: 87 bpm      Standing Orthostatic BP: 106/78      Standing Orthostatic Pulse: 97 bpm       Weight over time:  Vitals:    03/18/18 1323 03/19/18 1921 03/20/18 0815   Weight: 71.2 kg (157 lb) 70.3 kg (155 lb) 70.5 kg (155 lb 6.4 oz)       Orthostatic Vitals       Most Recent      Sitting Orthostatic /73 03/21 0808    Sitting Orthostatic Pulse (bpm) 87 03/21 0808    Standing Orthostatic /78 03/21 0808    Standing Orthostatic Pulse (bpm) 97 03/21 0808            Tejinder is a 32-year-old male wearing hospital scrubs.  His speech is of an appropriate rate and tone in his language is intact.  His behavior " is appropriate and he does not have any abnormal movements.  His affect is neutral.  His mood he describes as low.  His thought content consists of the above without thoughts of harming himself or others or current delusions.  His thought process is ruminative without looseness of association.  He does not have any abnormal perceptions.  He is alert and aware of his current location and circumstances.  His attention and concentration appears adequate.  His cognition and fund of knowledge appears intact.  His long-term/short-term/remote memory appears intact.  His insight and judgment are both partial.         Precautions:     Behavioral Orders   Procedures     Code 1 - Restrict to Unit     Routine Programming     As clinically indicated     Status 15     Every 15 minutes.     Suicide precautions          DIagnoses:     Generalized anxiety disorder  Alcohol use disorder severe  Methamphetamine use disorder severe  Heroin use disorder severe  Tobacco use disorder   Rule out ADHD versus bipolar 2         Assessment & Plan:     Tejinder has a long history of substance use and impulsivity that could be related to underlying ADHD.  There is also the possibility that he has bipolar 2 illness I am more convinced that it is likely ADHD.  He does have generalized anxiety and we are attempting to manage that with propranolol and venlafaxine that he just recently started.  The plan is to transition him to a chemical dependency treatment program at discharge.    Continue propranolol 10 mg 3 times a day for anxiety  Increase Effexor to 75mg Qday.   Continue voluntary hospitalization  Conducting a rule 25 assessment with chemical dependency treatment at discharge    The risks, benefits, alternatives and side effects have been discussed and are understood by the patient and other caregivers.        Non clinically relevant CMS requirements:  Clinical Global Impressions  First:  Considering your total clinical experience with this  particular patient population, how severe are the patient's symptoms at this time?: 6 (03/20/18 2131)  Compared to the patient's condition at the START of treatment, this patient's condition is:: 4 (03/20/18 2131)  Most recent:  Considering your total clinical experience with this particular patient population, how severe are the patient's symptoms at this time?: 6 (03/20/18 2131)  Compared to the patient's condition at the START of treatment, this patient's condition is:: 4 (03/20/18 2131)    # Pain Assessment:   Current Pain Score 3/21/2018 3/20/2018 3/19/2018   Patient currently in pain? no no denies   Pain location - - -   Pain descriptors - - -       Any incidence of pain both chronic or acute reported will be documented in above documentation though further documentation can also be found in the internal medicine documentation or pain specialist documentation.

## 2018-03-22 NOTE — PROGRESS NOTES
Writer met with Pt to see if Rule 25 paperwork has been completed, he reported almost completed.     Pt completed Rule 25 paperwork and handed information in for review.     Informed Rule 25  that Pt's paperwork has been completed. Writer placed completed paperwork in the front of Pt's paper chart for CD  to review.

## 2018-03-22 NOTE — PROGRESS NOTES
"Pt was in his room and sleeping for most of the shift, he came out for meals but would go right back to his room. Pt was isolative and withdrawn, his overall affect was flat/blunted, but he did brighten upon approach. When addressed he was calm and fairly cooperative. Pt endorsed feeling depressed and anxious, he rated both 6/10. Pt denied SI, SIB, AH and VH. Pt stated concentration was \"not good\" and he answered yes to racing thoughts. Pt stated appetite was \"really good\" and sleep \"ok\". Pt denied pain. Pt expressed no concerns.        03/22/18 1427   Behavioral Health   Hallucinations denies / not responding to hallucinations   Thinking poor concentration   Orientation person: oriented;place: oriented;date: oriented   Insight admits / accepts   Judgement intact   Eye Contact at examiner   Affect blunted, flat   Mood depressed;anxious   Physical Appearance/Attire disheveled   Hygiene well groomed   Suicidality other (see comments)  (Denies)   1. Wish to be Dead No   Self Injury other (see comment)  (Denies)   Elopement (None observed)   Activity isolative;withdrawn   Speech clear;coherent   Psychomotor / Gait balanced;steady   Activities of Daily Living   Hygiene/Grooming independent   Oral Hygiene independent   Dress scrubs (behavioral health)   Laundry with supervision   Room Organization independent     "

## 2018-03-23 PROCEDURE — 25000132 ZZH RX MED GY IP 250 OP 250 PS 637: Performed by: PSYCHIATRY & NEUROLOGY

## 2018-03-23 PROCEDURE — 12400007 ZZH R&B MH INTERMEDIATE UMMC

## 2018-03-23 PROCEDURE — 99232 SBSQ HOSP IP/OBS MODERATE 35: CPT | Performed by: PSYCHIATRY & NEUROLOGY

## 2018-03-23 RX ORDER — HYDROXYZINE HYDROCHLORIDE 50 MG/1
50 TABLET, FILM COATED ORAL 4 TIMES DAILY PRN
Status: DISCONTINUED | OUTPATIENT
Start: 2018-03-23 | End: 2018-03-27 | Stop reason: HOSPADM

## 2018-03-23 RX ORDER — HYDROXYZINE HYDROCHLORIDE 25 MG/1
25 TABLET, FILM COATED ORAL 4 TIMES DAILY PRN
Status: DISCONTINUED | OUTPATIENT
Start: 2018-03-23 | End: 2018-03-27 | Stop reason: HOSPADM

## 2018-03-23 RX ADMIN — ATAZANAVIR AND COBICISTAT 1 TABLET: 300; 150 TABLET ORAL at 09:06

## 2018-03-23 RX ADMIN — OLANZAPINE 10 MG: 10 TABLET, FILM COATED ORAL at 08:39

## 2018-03-23 RX ADMIN — OLANZAPINE 10 MG: 10 TABLET, FILM COATED ORAL at 18:17

## 2018-03-23 RX ADMIN — PROPRANOLOL HYDROCHLORIDE 10 MG: 10 TABLET ORAL at 08:39

## 2018-03-23 RX ADMIN — PROPRANOLOL HYDROCHLORIDE 10 MG: 10 TABLET ORAL at 14:30

## 2018-03-23 RX ADMIN — EMTRICITABINE AND TENOFOVIR ALAFENAMIDE 1 TABLET: 200; 25 TABLET ORAL at 09:06

## 2018-03-23 RX ADMIN — MAGNESIUM HYDROXIDE 30 ML: 400 SUSPENSION ORAL at 18:17

## 2018-03-23 RX ADMIN — PROPRANOLOL HYDROCHLORIDE 10 MG: 10 TABLET ORAL at 20:30

## 2018-03-23 RX ADMIN — TRAZODONE HYDROCHLORIDE 50 MG: 50 TABLET ORAL at 20:30

## 2018-03-23 RX ADMIN — LAMOTRIGINE 200 MG: 100 TABLET, FILM COATED, EXTENDED RELEASE ORAL at 08:39

## 2018-03-23 RX ADMIN — VENLAFAXINE HYDROCHLORIDE 75 MG: 75 TABLET, EXTENDED RELEASE ORAL at 08:39

## 2018-03-23 ASSESSMENT — ACTIVITIES OF DAILY LIVING (ADL)
DRESS: SCRUBS (BEHAVIORAL HEALTH)
LAUNDRY: WITH SUPERVISION
ORAL_HYGIENE: INDEPENDENT
GROOMING: INDEPENDENT

## 2018-03-23 NOTE — PROGRESS NOTES
Pt was calm and cooperative with staff, but seemed isolative to his room napping during the first part of the shift. Pt was out for dinner and minimally socialized with peers. Pt denied having thoughts of SI/SIB, hallucinations or paranoia. Pt rated 5 for depression, anxiety, sadness and irritability. Pt had no visitors and went to bed with no issue.        03/22/18 2100   Behavioral Health   Hallucinations denies / not responding to hallucinations   Thinking intact   Orientation place: oriented;date: oriented;person: oriented;time: oriented   Memory baseline memory   Insight poor   Judgement impaired   Affect blunted, flat   Mood labile;anxious;depressed;mood is calm;irritable   Physical Appearance/Attire neat   Hygiene well groomed   Suicidality other (see comments)  (Denied)   1. Wish to be Dead No   2. Non-Specific Active Suicidal Thoughts  No   Self Injury other (see comment)  (denies)   Activity isolative;withdrawn   Speech clear;coherent   Medication Sensitivity no stated side effects;no observed side effects   Psychomotor / Gait balanced;steady   Activities of Daily Living   Hygiene/Grooming independent;handwashing   Oral Hygiene independent   Dress scrubs (behavioral health)   Laundry unable to complete   Room Organization independent   Behavioral Health Interventions   Depression assist patient in developing safety plan;assist patient in following safety plan;provide emotional support;establish therapeutic relationship;assist with developing and utilizing healthy coping strategies;build upon strengths;assess patient response to medication;assess medication adherance;monitor need for prn medication;assess grief and loss issues   Social and Therapeutic Interventions (Depression) encourage socialization with peers;encourage participation in therapeutic groups and milieu activities

## 2018-03-23 NOTE — PROGRESS NOTES
"[facility], [place]   Psychiatric Progress Note  Tejinder Doyle  [id number]  [birth year]    Chief Complaint: Continued medical care          Interim History:   The patient's care was discussed with the treatment team during the daily team meeting and/or staff's chart notes were reviewed.  Staff report patient completed his Rule 25 paperwork and it has been sent in.     The patient reports that he is tired. Mood is \"bad.\" Didn't sleep well last night. Denies SI or HI. Still agreeable to enter directly into CD treatment.          Medications:       venlafaxine  75 mg Oral Daily with breakfast     propranolol  10 mg Oral TID     atazanavir-cobicistat  1 tablet Oral Daily     lamoTRIgine  200 mg Oral Daily     emtricitabine-tenofovir AF  1 tablet Oral Daily          Allergies:     Allergies   Allergen Reactions     Sulfa Drugs           Labs:   No results found for this or any previous visit (from the past 24 hour(s)).       Psychiatric Examination:     /71  Pulse 78  Temp 96.8  F (36  C)  Resp 16  Ht 1.727 m (5' 8\")  Wt 70.5 kg (155 lb 6.4 oz)  SpO2 95%  BMI 23.63 kg/m2  Weight is 155 lbs 6.4 oz  Body mass index is 23.63 kg/(m^2).                Sitting Orthostatic BP: 120/73      Sitting Orthostatic Pulse: 87 bpm      Standing Orthostatic BP: 106/78      Standing Orthostatic Pulse: 97 bpm       Weight over time:  Vitals:    03/18/18 1323 03/19/18 1921 03/20/18 0815   Weight: 71.2 kg (157 lb) 70.3 kg (155 lb) 70.5 kg (155 lb 6.4 oz)       Orthostatic Vitals       Most Recent      Sitting Orthostatic /73 03/21 0808    Sitting Orthostatic Pulse (bpm) 87 03/21 0808    Standing Orthostatic /78 03/21 0808    Standing Orthostatic Pulse (bpm) 97 03/21 0808            Tejinder is a 32-year-old male wearing hospital scrubs.  His speech is of an appropriate rate and tone in his language is intact.  His behavior is appropriate and he does not have any abnormal movements.  His " "affect is neutral.  His mood he describes as \"bad\".  His thought content consists of the above without thoughts of harming himself or others or current delusions.  His thought process is ruminative without looseness of association.  He does not have any abnormal perceptions.  He is alert and aware of his current location and circumstances.  His attention and concentration appears adequate.  His cognition and fund of knowledge appears intact.  His long-term/short-term/remote memory appears intact.  His insight and judgment are both partial.         Precautions:     Behavioral Orders   Procedures     Code 1 - Restrict to Unit     Routine Programming     As clinically indicated     Status 15     Every 15 minutes.     Suicide precautions          DIagnoses:     Generalized anxiety disorder  Alcohol use disorder severe  Methamphetamine use disorder severe  Heroin use disorder severe  Tobacco use disorder   Rule out ADHD versus bipolar 2         Assessment & Plan:     Tejinder has a long history of substance use and impulsivity that could be related to underlying ADHD.  There is also the possibility that he has bipolar 2 illness I am more convinced that it is likely ADHD.  He does have generalized anxiety and we are attempting to manage that with propranolol and venlafaxine that he just recently started.  The plan is to transition him to a chemical dependency treatment program at discharge.    Continue propranolol 10 mg 3 times a day for anxiety  Continue Effexor 75mg Qday.   Continue voluntary hospitalization  Conducting a rule 25 assessment with chemical dependency treatment at discharge    The risks, benefits, alternatives and side effects have been discussed and are understood by the patient and other caregivers.        Non clinically relevant CMS requirements:  Clinical Global Impressions  First:  Considering your total clinical experience with this particular patient population, how severe are the patient's symptoms at " this time?: 6 (03/20/18 2131)  Compared to the patient's condition at the START of treatment, this patient's condition is:: 4 (03/20/18 2131)  Most recent:  Considering your total clinical experience with this particular patient population, how severe are the patient's symptoms at this time?: 6 (03/20/18 2131)  Compared to the patient's condition at the START of treatment, this patient's condition is:: 4 (03/20/18 2131)    # Pain Assessment:   Current Pain Score 3/23/2018 3/21/2018 3/20/2018   Patient currently in pain? denies no no   Pain location - - -   Pain descriptors - - -       Any incidence of pain both chronic or acute reported will be documented in above documentation though further documentation can also be found in the internal medicine documentation or pain specialist documentation.

## 2018-03-23 NOTE — PROGRESS NOTES
Pt was calm, pleasant, cooperative with blunted affect, withdrawn and isolative to his room for most of the day sleeping. He was guarded with writer upon check-in, stated he did not have a goal for today and does not plan to attend any groups. Pt endorses 5/10 depression and 5/10 anxiety, stating he is anxious about being here. Denies hallucinations or racing thoughts, denies SI or SIB thoughts. No other changes at this time.      03/23/18 1100   Behavioral Health   Hallucinations denies / not responding to hallucinations   Thinking intact   Orientation person: oriented;place: oriented;date: oriented;time: oriented   Memory baseline memory   Insight admits / accepts   Judgement impaired   Eye Contact at examiner   Affect blunted, flat   Mood depressed;anxious;mood is calm   Physical Appearance/Attire attire appropriate to age and situation   Hygiene well groomed   Suicidality other (see comments)  (Denies)   1. Wish to be Dead No   2. Non-Specific Active Suicidal Thoughts  No   Self Injury other (see comment)  (Denies)   Elopement (None stated or observed)   Activity isolative;withdrawn   Speech clear;coherent   Medication Sensitivity no stated side effects;no observed side effects   Psychomotor / Gait balanced;steady   Activities of Daily Living   Hygiene/Grooming independent   Oral Hygiene independent   Dress scrubs (behavioral health)   Laundry with supervision   Room Organization independent

## 2018-03-24 PROCEDURE — 25000132 ZZH RX MED GY IP 250 OP 250 PS 637: Performed by: PSYCHIATRY & NEUROLOGY

## 2018-03-24 PROCEDURE — 12400007 ZZH R&B MH INTERMEDIATE UMMC

## 2018-03-24 RX ADMIN — HYDROXYZINE HYDROCHLORIDE 50 MG: 50 TABLET, FILM COATED ORAL at 09:01

## 2018-03-24 RX ADMIN — TRAZODONE HYDROCHLORIDE 50 MG: 50 TABLET ORAL at 22:07

## 2018-03-24 RX ADMIN — MAGNESIUM HYDROXIDE 30 ML: 400 SUSPENSION ORAL at 11:12

## 2018-03-24 RX ADMIN — VENLAFAXINE HYDROCHLORIDE 75 MG: 75 TABLET, EXTENDED RELEASE ORAL at 09:01

## 2018-03-24 RX ADMIN — NICOTINE POLACRILEX 4 MG: 4 LOZENGE ORAL at 11:34

## 2018-03-24 RX ADMIN — EMTRICITABINE AND TENOFOVIR ALAFENAMIDE 1 TABLET: 200; 25 TABLET ORAL at 09:01

## 2018-03-24 RX ADMIN — PROPRANOLOL HYDROCHLORIDE 10 MG: 10 TABLET ORAL at 09:01

## 2018-03-24 RX ADMIN — PROPRANOLOL HYDROCHLORIDE 10 MG: 10 TABLET ORAL at 20:32

## 2018-03-24 RX ADMIN — ATAZANAVIR AND COBICISTAT 1 TABLET: 300; 150 TABLET ORAL at 09:01

## 2018-03-24 RX ADMIN — BISACODYL 10 MG: 10 SUPPOSITORY RECTAL at 12:38

## 2018-03-24 RX ADMIN — OLANZAPINE 10 MG: 10 TABLET, FILM COATED ORAL at 14:41

## 2018-03-24 RX ADMIN — PROPRANOLOL HYDROCHLORIDE 10 MG: 10 TABLET ORAL at 14:41

## 2018-03-24 RX ADMIN — TRAZODONE HYDROCHLORIDE 50 MG: 50 TABLET ORAL at 20:33

## 2018-03-24 RX ADMIN — LAMOTRIGINE 200 MG: 100 TABLET, FILM COATED, EXTENDED RELEASE ORAL at 09:01

## 2018-03-24 RX ADMIN — NICOTINE POLACRILEX 8 MG: 4 LOZENGE ORAL at 14:41

## 2018-03-24 RX ADMIN — NICOTINE POLACRILEX 8 MG: 4 LOZENGE ORAL at 19:34

## 2018-03-24 ASSESSMENT — ACTIVITIES OF DAILY LIVING (ADL)
GROOMING: INDEPENDENT
LAUNDRY: WITH SUPERVISION
DRESS: STREET CLOTHES
ORAL_HYGIENE: INDEPENDENT

## 2018-03-24 NOTE — PROGRESS NOTES
Case Management Note  Writer met with patient to complete rule 25 assessment and sign documentation.  Patient verbalized desire to go to residential treatment at [facility].  Assessment and DREW) were sent to [facility] at [phone]/fax [phone] and Funding docuemntation and assessment were sent to the Clinical Review team at [facility]  Phone Number:  [phone]  s Fax Number [phone]    Documentation was left on the [facility]'s desk in [place] .  [facility] to follow up with both parties on Monday

## 2018-03-24 NOTE — PLAN OF CARE
"Problem: Depressive Symptoms  Goal: Depressive Symptoms  Patient will remain safe without any self harm during hospitalization.  Patient will have decreased thoughts of self harm and/or suicidal ideation  Patient will not have episodes of aggressive behavior  Patient will report improved sleep and feeling rested upon waking.  Patient will have adequate daily oral intake.   Patient will have improvement in self-care, including personal hygiene.   Patient will verbalize and demonstrate an ability to cope for their age.   Patient will be able to participate and initiate activities and conversation with others.   Patient will discuss feelings of hopelessness and express an interest in the future.   By discharge the patient will report an increase in sense of control over their current situation.(i.e by verbalizing coping techniques to help get their life back on track and/or naming people they can talk to when they need emotional assistance). Signs and symptoms of listed problems will be absent or manageable.   Outcome: Therapy, progress toward functional goals as expected  48 Hour Assessment    Pt states he had no goal today. He has completed Rule 25 paperwork and hopes to find a treatment program he likes. Spent most of shift in room.Isolative/withdrawn. Affect brightens on approach. Rates depression 5-6 out of 10. Rates anxiety \"the same\" Pt denies SI/SIB/AH. Medication compliant.      "

## 2018-03-24 NOTE — PROGRESS NOTES
Pt did not attend either of the morning groups but did watch 50% of a mental health video. Pt has spent most of the day in his room, usually sleeping. Pt has not been social even when in the milieu. Pt's affect stays neutral regardless of what he is saying. Pt rated his depression and anxiety both moderate. Pt denies SI. Pt is set to discharge on Monday and will go to CD tx.     03/24/18 1413   Behavioral Health   Hallucinations denies / not responding to hallucinations   Thinking distractable   Orientation person: oriented;place: oriented;date: oriented;time: oriented   Memory baseline memory   Insight admits / accepts   Judgement impaired   Eye Contact at examiner   Affect (mid-range)   Mood mood is calm   Physical Appearance/Attire appears stated age;attire appropriate to age and situation;neat   Hygiene (adequate)   Suicidality (denies)   1. Wish to be Dead No   2. Non-Specific Active Suicidal Thoughts  No   Self Injury (denies)   Elopement (no signs of eloping)   Activity hyperactive (agitated, impulsive)   Speech coherent;clear   Medication Sensitivity no observed side effects;no stated side effects   Psychomotor / Gait balanced;steady
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
## 2018-03-25 PROCEDURE — 25000132 ZZH RX MED GY IP 250 OP 250 PS 637: Performed by: PSYCHIATRY & NEUROLOGY

## 2018-03-25 PROCEDURE — 12400007 ZZH R&B MH INTERMEDIATE UMMC

## 2018-03-25 RX ADMIN — PROPRANOLOL HYDROCHLORIDE 10 MG: 10 TABLET ORAL at 13:57

## 2018-03-25 RX ADMIN — OLANZAPINE 10 MG: 10 TABLET, FILM COATED ORAL at 17:41

## 2018-03-25 RX ADMIN — EMTRICITABINE AND TENOFOVIR ALAFENAMIDE 1 TABLET: 200; 25 TABLET ORAL at 08:55

## 2018-03-25 RX ADMIN — TRAZODONE HYDROCHLORIDE 50 MG: 50 TABLET ORAL at 21:46

## 2018-03-25 RX ADMIN — PROPRANOLOL HYDROCHLORIDE 10 MG: 10 TABLET ORAL at 08:55

## 2018-03-25 RX ADMIN — PROPRANOLOL HYDROCHLORIDE 10 MG: 10 TABLET ORAL at 20:19

## 2018-03-25 RX ADMIN — LAMOTRIGINE 200 MG: 100 TABLET, FILM COATED, EXTENDED RELEASE ORAL at 08:55

## 2018-03-25 RX ADMIN — TRAZODONE HYDROCHLORIDE 50 MG: 50 TABLET ORAL at 20:19

## 2018-03-25 RX ADMIN — VENLAFAXINE HYDROCHLORIDE 75 MG: 75 TABLET, EXTENDED RELEASE ORAL at 08:55

## 2018-03-25 RX ADMIN — OLANZAPINE 10 MG: 10 TABLET, FILM COATED ORAL at 10:05

## 2018-03-25 RX ADMIN — ATAZANAVIR AND COBICISTAT 1 TABLET: 300; 150 TABLET ORAL at 08:55

## 2018-03-25 ASSESSMENT — ACTIVITIES OF DAILY LIVING (ADL)
ORAL_HYGIENE: INDEPENDENT
LAUNDRY: WITH SUPERVISION
LAUNDRY: WITH SUPERVISION
DRESS: INDEPENDENT
DRESS: STREET CLOTHES
GROOMING: INDEPENDENT
GROOMING: INDEPENDENT
ORAL_HYGIENE: INDEPENDENT

## 2018-03-25 NOTE — PLAN OF CARE
"Problem: Depressive Symptoms  Goal: Depressive Symptoms  Patient will remain safe without any self harm during hospitalization.  Patient will have decreased thoughts of self harm and/or suicidal ideation  Patient will not have episodes of aggressive behavior  Patient will report improved sleep and feeling rested upon waking.  Patient will have adequate daily oral intake.   Patient will have improvement in self-care, including personal hygiene.   Patient will verbalize and demonstrate an ability to cope for their age.   Patient will be able to participate and initiate activities and conversation with others.   Patient will discuss feelings of hopelessness and express an interest in the future.   By discharge the patient will report an increase in sense of control over their current situation.(i.e by verbalizing coping techniques to help get their life back on track and/or naming people they can talk to when they need emotional assistance). Signs and symptoms of listed problems will be absent or manageable.   Outcome: Improving  \"I'm doing better, I know what I need to do.\"  Indicates depression level is the same, rates this at a 5 to 6 on a scale with 10 being the worst.  States anxiety fluctuates and is worst in the morning, rating this at a 5 on the same scale.  Denies suicidal ideation or wishes to be dead.  States focus and concentration is somewhat improved, racing thoughts have decreased, continues to experience ruminating thoughts that are unchanged.  States sleep has improved and slept well last night except experienced some \"bad dreams\", was able to wake self.  Attributes this to medication, denies other side effects.  Feels Trazodone is beneficial in improving sleep.  \"I've also been out of bed more so that is good.\"  Feels has \"a little\" more energy and motivation.  States has had 9 or 10 CD treatments, but feels more confident as was sober for a year prior to most recent relapse.  Feels going to the " program that has been recommended by rule 25  will be beneficial as it is an MI/CD program.  Indicates will attempt to attend more frequent NA meetings and obtain a sponsor.  Will continue to work with therapist weekly and a medication provider.  Was up for breakfast and medication then returned back to bed.

## 2018-03-25 NOTE — PLAN OF CARE
Problem: Patient Care Overview  Goal: Individualization & Mutuality  Illness Management Recovery model:  Self-Reflection & Planning.    Assessed patient's progress completing forms related to Illness Management Recovery (including Personal Plan of Care, Adult Coping Plan, and My Support and Coping Plan) and assisted as needed.    Encouraged patient to continue to consider triggers, wellness strategies, early warning signs, feedback from others, actions to take to prevent relapse, and coping strategies as part of a plan to remain well after leaving the hospital.

## 2018-03-26 PROCEDURE — 87591 N.GONORRHOEAE DNA AMP PROB: CPT | Performed by: PHYSICIAN ASSISTANT

## 2018-03-26 PROCEDURE — 25000132 ZZH RX MED GY IP 250 OP 250 PS 637: Performed by: CLINICAL NURSE SPECIALIST

## 2018-03-26 PROCEDURE — 99207 ZZC CONSULT E&M CHANGED TO INITIAL LEVEL: CPT | Performed by: PHYSICIAN ASSISTANT

## 2018-03-26 PROCEDURE — 87491 CHLMYD TRACH DNA AMP PROBE: CPT | Performed by: PHYSICIAN ASSISTANT

## 2018-03-26 PROCEDURE — 25000132 ZZH RX MED GY IP 250 OP 250 PS 637: Performed by: PSYCHIATRY & NEUROLOGY

## 2018-03-26 PROCEDURE — 99232 SBSQ HOSP IP/OBS MODERATE 35: CPT | Performed by: PSYCHIATRY & NEUROLOGY

## 2018-03-26 PROCEDURE — 12400007 ZZH R&B MH INTERMEDIATE UMMC

## 2018-03-26 PROCEDURE — 99222 1ST HOSP IP/OBS MODERATE 55: CPT | Performed by: PHYSICIAN ASSISTANT

## 2018-03-26 PROCEDURE — 25000132 ZZH RX MED GY IP 250 OP 250 PS 637: Performed by: PHYSICIAN ASSISTANT

## 2018-03-26 RX ORDER — TRAZODONE HYDROCHLORIDE 100 MG/1
100 TABLET ORAL
Status: DISCONTINUED | OUTPATIENT
Start: 2018-03-26 | End: 2018-03-27 | Stop reason: HOSPADM

## 2018-03-26 RX ORDER — DOCUSATE SODIUM 100 MG/1
100 CAPSULE, LIQUID FILLED ORAL 2 TIMES DAILY
Status: DISCONTINUED | OUTPATIENT
Start: 2018-03-26 | End: 2018-03-27 | Stop reason: HOSPADM

## 2018-03-26 RX ORDER — CHLORHEXIDINE GLUCONATE ORAL RINSE 1.2 MG/ML
15 SOLUTION DENTAL 2 TIMES DAILY
Status: DISCONTINUED | OUTPATIENT
Start: 2018-03-26 | End: 2018-03-27 | Stop reason: HOSPADM

## 2018-03-26 RX ADMIN — CHLORHEXIDINE GLUCONATE 15 ML: 1.2 RINSE ORAL at 17:55

## 2018-03-26 RX ADMIN — AMOXICILLIN AND CLAVULANATE POTASSIUM 1 TABLET: 875; 125 TABLET, FILM COATED ORAL at 20:21

## 2018-03-26 RX ADMIN — CHLORHEXIDINE GLUCONATE 15 ML: 1.2 RINSE ORAL at 20:21

## 2018-03-26 RX ADMIN — VENLAFAXINE HYDROCHLORIDE 75 MG: 75 TABLET, EXTENDED RELEASE ORAL at 08:48

## 2018-03-26 RX ADMIN — DOCUSATE SODIUM 100 MG: 100 CAPSULE, LIQUID FILLED ORAL at 20:21

## 2018-03-26 RX ADMIN — PROPRANOLOL HYDROCHLORIDE 10 MG: 10 TABLET ORAL at 08:48

## 2018-03-26 RX ADMIN — ATAZANAVIR AND COBICISTAT 1 TABLET: 300; 150 TABLET ORAL at 08:48

## 2018-03-26 RX ADMIN — PROPRANOLOL HYDROCHLORIDE 10 MG: 10 TABLET ORAL at 14:30

## 2018-03-26 RX ADMIN — OLANZAPINE 10 MG: 10 TABLET, FILM COATED ORAL at 08:48

## 2018-03-26 RX ADMIN — AMOXICILLIN AND CLAVULANATE POTASSIUM 1 TABLET: 875; 125 TABLET, FILM COATED ORAL at 12:50

## 2018-03-26 RX ADMIN — LAMOTRIGINE 200 MG: 100 TABLET, FILM COATED, EXTENDED RELEASE ORAL at 08:48

## 2018-03-26 RX ADMIN — TRAZODONE HYDROCHLORIDE 100 MG: 100 TABLET ORAL at 20:21

## 2018-03-26 RX ADMIN — EMTRICITABINE AND TENOFOVIR ALAFENAMIDE 1 TABLET: 200; 25 TABLET ORAL at 08:49

## 2018-03-26 RX ADMIN — NICOTINE POLACRILEX 8 MG: 4 LOZENGE ORAL at 16:26

## 2018-03-26 RX ADMIN — OLANZAPINE 10 MG: 10 TABLET, FILM COATED ORAL at 16:26

## 2018-03-26 ASSESSMENT — ACTIVITIES OF DAILY LIVING (ADL)
ORAL_HYGIENE: INDEPENDENT
DRESS: INDEPENDENT
GROOMING: INDEPENDENT
ORAL_HYGIENE: INDEPENDENT
LAUNDRY: WITH SUPERVISION
GROOMING: INDEPENDENT
DRESS: INDEPENDENT

## 2018-03-26 NOTE — PROGRESS NOTES
03/26/18 1455   Behavioral Health   Hallucinations denies / not responding to hallucinations   Thinking poor concentration   Orientation person: oriented;place: oriented   Memory baseline memory   Insight insight appropriate to situation   Judgement impaired   Eye Contact at examiner   Affect blunted, flat   Mood mood is calm   Physical Appearance/Attire attire appropriate to age and situation   Hygiene neglected grooming - unclean body, hair, teeth   Suicidality other (see comments)  (pt denies)   1. Wish to be Dead No   2. Non-Specific Active Suicidal Thoughts  No   Self Injury other (see comment)  (pt denies)   Elopement (none observed)   Activity isolative;withdrawn   Speech clear;coherent   Psychomotor / Gait balanced;steady   Psycho Education   Type of Intervention 1:1 intervention   Response participates, initiates socially appropriate   Hours 0.5   Treatment Detail (check-in)   Activities of Daily Living   Hygiene/Grooming independent   Oral Hygiene independent   Dress independent   Room Organization independent   Activity   Activity Assistance Provided independent   Behavioral Health Interventions   Depression maintain safety precautions;build upon strengths;establish therapeutic relationship;provide emotional support;encourage nutrition and hydration   Social and Therapeutic Interventions (Depression) encourage socialization with peers;encourage effective boundaries with peers;encourage participation in therapeutic groups and milieu activities     Pt spent majority of shift in his room bed resting; came out for food, but would quickly return back to his room. Presents with a blunt/ flat affect and depressed. Pt denied check-in with staff, but stated he had a headache. Pt denied any SI/SIB this shift.

## 2018-03-26 NOTE — PROGRESS NOTES
03/25/18 2038   Behavioral Health   Hallucinations denies / not responding to hallucinations   Thinking poor concentration   Orientation time: oriented;date: oriented;place: oriented   Memory baseline memory   Insight insight appropriate to situation   Judgement impaired   Eye Contact at examiner   Affect blunted, flat   Mood mood is calm   Physical Appearance/Attire attire appropriate to age and situation   Hygiene neglected grooming - unclean body, hair, teeth   Suicidality (denied )   1. Wish to be Dead No   2. Non-Specific Active Suicidal Thoughts  No   Self Injury (denied )   Elopement (denied )   Activity isolative;withdrawn   Speech coherent;clear   Medication Sensitivity no stated side effects   Psychomotor / Gait steady;balanced   Psycho Education   Type of Intervention 1:1 intervention   Response participates, initiates socially appropriate   Hours 0.5   Activities of Daily Living   Hygiene/Grooming independent   Oral Hygiene independent   Dress street clothes   Laundry with supervision   Room Organization independent   Activity   Activity Assistance Provided independent   Behavioral Health Interventions   Depression monitor need to revise level of observation;maintain safety precautions;maintain safe secure environment;encourage participation / independence with adls   Social and Therapeutic Interventions (Depression) encourage participation in therapeutic groups and milieu activities;encourage effective boundaries with peers;encourage socialization with peers     Pt. Was calm and cooperative. Pt. Isolated in his room for the majority part of the shift. Pt. Denied any paranoid thoughts and psychotic symptoms. Pt. Said he is still having depression and anxiety. Pt. Is hopping to discharge tomorrow. Pt's appetite was good and sleeping well.

## 2018-03-26 NOTE — PROGRESS NOTES
"[facility], [place]   Psychiatric Progress Note  Tejinder Doyle  [id number]  03/21/18    Chief Complaint: Continued medical care          Interim History:   The patient's care was discussed with the treatment team during the daily team meeting and/or staff's chart notes were reviewed.  Staff report patient is doing well on the unit.     The patient reports that he is better. Mood is \"anxious\" but \"better.\" Sleeping and eating well. Is hopeful for the medium intensity program at [facility]. Does feel that his gums are \"red and inflamed\" and is concerned he might have an infection as he was off his antiretroviral medications while binging. Denies SI or HI.          Medications:       venlafaxine  75 mg Oral Daily with breakfast     propranolol  10 mg Oral TID     atazanavir-cobicistat  1 tablet Oral Daily     lamoTRIgine  200 mg Oral Daily     emtricitabine-tenofovir AF  1 tablet Oral Daily          Allergies:     Allergies   Allergen Reactions     Sulfa Drugs           Labs:   No results found for this or any previous visit (from the past 24 hour(s)).       Psychiatric Examination:     /73  Pulse 89  Temp 96.9  F (36.1  C)  Resp 16  Ht 1.727 m (5' 8\")  Wt 72.7 kg (160 lb 3.2 oz)  SpO2 95%  BMI 24.36 kg/m2  Weight is 160 lbs 3.2 oz  Body mass index is 24.36 kg/(m^2).                Sitting Orthostatic BP: 120/73      Sitting Orthostatic Pulse: 87 bpm      Standing Orthostatic BP: 106/78      Standing Orthostatic Pulse: 97 bpm       Weight over time:  Vitals:    03/18/18 1323 03/19/18 1921 03/20/18 0815 03/25/18 0842   Weight: 71.2 kg (157 lb) 70.3 kg (155 lb) 70.5 kg (155 lb 6.4 oz) 72.7 kg (160 lb 3.2 oz)       Orthostatic Vitals       Most Recent      Sitting Orthostatic /73 03/21 0808    Sitting Orthostatic Pulse (bpm) 87 03/21 0808    Standing Orthostatic /78 03/21 0808    Standing Orthostatic Pulse (bpm) 97 03/21 0808            Tejinder is a 32-year-old male " "wearing hospital scrubs.  His speech is of an appropriate rate and tone in his language is intact.  His behavior is appropriate and he does not have any abnormal movements.  His affect is neutral.  His mood he describes as \"anxious and better\".  His thought content consists of the above without thoughts of harming himself or others or current delusions.  His thought process is ruminative without looseness of association.  He does not have any abnormal perceptions.  He is alert and aware of his current location and circumstances.  His attention and concentration appears adequate.  His cognition and fund of knowledge appears intact.  His long-term/short-term/remote memory appears intact.  His insight and judgment are both fair.         Precautions:     Behavioral Orders   Procedures     Code 1 - Restrict to Unit     Routine Programming     As clinically indicated     Status 15     Every 15 minutes.     Suicide precautions          DIagnoses:     Generalized anxiety disorder  Alcohol use disorder severe  Methamphetamine use disorder severe  Heroin use disorder severe  Tobacco use disorder   Rule out ADHD versus bipolar 2         Assessment & Plan:     Tejinder has a long history of substance use and impulsivity that could be related to underlying ADHD.  There is also the possibility that he has bipolar 2 illness I am more convinced that it is likely ADHD.  He does have generalized anxiety and we are attempting to manage that with propranolol and venlafaxine that he just recently started.  The plan is to transition him to a chemical dependency treatment program at discharge.    Continue propranolol 10 mg 3 times a day for anxiety  Continue Effexor 75mg Qday.   Increase Trazodone to 100mg Qhs.   Continue voluntary hospitalization  Conducted a rule 25 assessment with chemical dependency treatment at discharge.  IM to see for concern of gum infection due to being off anti-retrovirals.     The risks, benefits, alternatives and " side effects have been discussed and are understood by the patient and other caregivers.        Non clinically relevant CMS requirements:  Clinical Global Impressions  First:  Considering your total clinical experience with this particular patient population, how severe are the patient's symptoms at this time?: 6 (03/20/18 2131)  Compared to the patient's condition at the START of treatment, this patient's condition is:: 4 (03/20/18 2131)  Most recent:  Considering your total clinical experience with this particular patient population, how severe are the patient's symptoms at this time?: 6 (03/20/18 2131)  Compared to the patient's condition at the START of treatment, this patient's condition is:: 4 (03/20/18 2131)    # Pain Assessment:   Current Pain Score 3/25/2018 3/24/2018 3/23/2018   Patient currently in pain? no no denies   Pain location - - -   Pain descriptors - - -       Any incidence of pain both chronic or acute reported will be documented in above documentation though further documentation can also be found in the internal medicine documentation or pain specialist documentation.

## 2018-03-26 NOTE — PROGRESS NOTES
Case Management Note  Erum Scott ([phone]) called from [facility] Clinical Review saying that patient was approved [facility] as well as for Medication Assisted Therapy (Methadone) at [facility] [place] [facility].      Writer spoke with Villa Haynes ([phone]) AT [facility] to follow up on referral.  He asked writer to follow up with a co-worker named Mone but the number he gave was not correct.      CTC to follow up with Villa or Mone on Tuesday to schedule follow up/intake    Physician to discuss MAT with patient and if he wishes to pursue, CTC schedule follow up with [facility] (if patient wishes to participate in MAT)
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
## 2018-03-27 VITALS
WEIGHT: 161.1 LBS | BODY MASS INDEX: 24.41 KG/M2 | TEMPERATURE: 97.4 F | HEART RATE: 89 BPM | SYSTOLIC BLOOD PRESSURE: 109 MMHG | DIASTOLIC BLOOD PRESSURE: 60 MMHG | HEIGHT: 68 IN | RESPIRATION RATE: 16 BRPM | OXYGEN SATURATION: 95 %

## 2018-03-27 LAB
C TRACH DNA SPEC QL NAA+PROBE: NEGATIVE
N GONORRHOEA DNA SPEC QL NAA+PROBE: NEGATIVE
SPECIMEN SOURCE: NORMAL
SPECIMEN SOURCE: NORMAL

## 2018-03-27 PROCEDURE — 99238 HOSP IP/OBS DSCHRG MGMT 30/<: CPT | Performed by: PSYCHIATRY & NEUROLOGY

## 2018-03-27 PROCEDURE — 25000132 ZZH RX MED GY IP 250 OP 250 PS 637: Performed by: CLINICAL NURSE SPECIALIST

## 2018-03-27 PROCEDURE — 25000132 ZZH RX MED GY IP 250 OP 250 PS 637: Performed by: PSYCHIATRY & NEUROLOGY

## 2018-03-27 PROCEDURE — 25000132 ZZH RX MED GY IP 250 OP 250 PS 637: Performed by: PHYSICIAN ASSISTANT

## 2018-03-27 RX ORDER — HYDROXYZINE HYDROCHLORIDE 50 MG/1
50 TABLET, FILM COATED ORAL 4 TIMES DAILY PRN
Qty: 90 TABLET | Refills: 1 | Status: SHIPPED | OUTPATIENT
Start: 2018-03-27 | End: 2020-09-03

## 2018-03-27 RX ORDER — CHLORHEXIDINE GLUCONATE ORAL RINSE 1.2 MG/ML
15 SOLUTION DENTAL 2 TIMES DAILY
Qty: 473 ML | Refills: 0 | Status: SHIPPED | OUTPATIENT
Start: 2018-03-27 | End: 2020-09-03

## 2018-03-27 RX ORDER — LAMOTRIGINE 100 MG/1
200 TABLET, EXTENDED RELEASE ORAL DAILY
Qty: 60 TABLET | Refills: 1 | Status: SHIPPED | OUTPATIENT
Start: 2018-03-28 | End: 2018-10-09

## 2018-03-27 RX ORDER — PROPRANOLOL HYDROCHLORIDE 10 MG/1
10 TABLET ORAL 3 TIMES DAILY
Qty: 90 TABLET | Refills: 1 | Status: SHIPPED | OUTPATIENT
Start: 2018-03-27 | End: 2020-09-03

## 2018-03-27 RX ORDER — TRAZODONE HYDROCHLORIDE 100 MG/1
100 TABLET ORAL
Qty: 60 TABLET | Refills: 1 | Status: SHIPPED | OUTPATIENT
Start: 2018-03-27 | End: 2020-09-03

## 2018-03-27 RX ORDER — VENLAFAXINE HYDROCHLORIDE 75 MG/1
75 TABLET, EXTENDED RELEASE ORAL
Qty: 30 EACH | Refills: 1 | Status: SHIPPED | OUTPATIENT
Start: 2018-03-28 | End: 2018-10-09

## 2018-03-27 RX ADMIN — CHLORHEXIDINE GLUCONATE 15 ML: 1.2 RINSE ORAL at 08:46

## 2018-03-27 RX ADMIN — LAMOTRIGINE 200 MG: 100 TABLET, FILM COATED, EXTENDED RELEASE ORAL at 08:46

## 2018-03-27 RX ADMIN — PROPRANOLOL HYDROCHLORIDE 10 MG: 10 TABLET ORAL at 13:34

## 2018-03-27 RX ADMIN — ATAZANAVIR AND COBICISTAT 1 TABLET: 300; 150 TABLET ORAL at 08:47

## 2018-03-27 RX ADMIN — DOCUSATE SODIUM 100 MG: 100 CAPSULE, LIQUID FILLED ORAL at 08:46

## 2018-03-27 RX ADMIN — PROPRANOLOL HYDROCHLORIDE 10 MG: 10 TABLET ORAL at 08:46

## 2018-03-27 RX ADMIN — NICOTINE POLACRILEX 8 MG: 4 LOZENGE ORAL at 12:29

## 2018-03-27 RX ADMIN — VENLAFAXINE HYDROCHLORIDE 75 MG: 75 TABLET, EXTENDED RELEASE ORAL at 08:46

## 2018-03-27 RX ADMIN — AMOXICILLIN AND CLAVULANATE POTASSIUM 1 TABLET: 875; 125 TABLET, FILM COATED ORAL at 08:46

## 2018-03-27 RX ADMIN — EMTRICITABINE AND TENOFOVIR ALAFENAMIDE 1 TABLET: 200; 25 TABLET ORAL at 08:47

## 2018-03-27 ASSESSMENT — ACTIVITIES OF DAILY LIVING (ADL)
GROOMING: INDEPENDENT
ORAL_HYGIENE: INDEPENDENT
DRESS: INDEPENDENT

## 2018-03-27 NOTE — PLAN OF CARE
Problem: Substance Withdrawal  Goal: Substance Withdrawal  Signs and symptoms of listed problems will be absent or manageable.   Pt denies suicidal ideation or homicidal ideation. Has received all belongings. Discharge medications and follow up instructions reviewed with patient. Patient verbalizes understanding of discharge instructions.

## 2018-03-27 NOTE — PROGRESS NOTES
"Brief Medicine Note    IM following for STI testing.  Chlamydia and Gonorrhea negative.     Internal Medicine will sign off at this time. Please notify if any intercurrent medical questions or concerns arise. Thank you for involving me in this patients care.       Halle Isaac PA-C  Hospitalist Service  [phone]      /60  Pulse 89  Temp 97.4  F (36.3  C)  Resp 16  Ht 1.727 m (5' 8\")  Wt 73.1 kg (161 lb 1.6 oz)  SpO2 95%  BMI 24.5 kg/m2        "

## 2018-03-27 NOTE — PROGRESS NOTES
"Tejinder was very calm and maintained great eye contact during check in. Pt doesn't say much to peer but is very calm around others.  Pt shared that today is a good day for him because his partner had a good news. \"I talk to my partner. He got the job he interviewed for, so that was really exciting. It made my day!\" Pt sated that \"I have a plan. I want to go to [facility]. It's a treatment place.\" Pt goal is to\" keep talking with my \" and his next steps to meeting this gaol is \"Just making the effort to talk to her.\" Pt rated both his depression and anxiety level 4/10. Pt concentration is good with no racing thoughts and no Psychotic symptoms. Pt appetite is good, sleep is good and denies pain.      03/26/18 1900   Behavioral Health   Hallucinations denies / not responding to hallucinations   Thinking intact   Orientation person: oriented;place: oriented;date: oriented;time: oriented   Memory baseline memory   Insight insight appropriate to situation   Eye Contact at examiner   Affect blunted, flat   Mood other (see comments)   ADL Assessment (WDL) (Calm mood)   Physical Appearance/Attire attire appropriate to age and situation   Hygiene well groomed   Suicidality other (see comments)   1. Wish to be Dead No  (Denies suidality )   2. Non-Specific Active Suicidal Thoughts  No   Self Injury other (see comment)   Activity isolative;withdrawn   Speech clear;coherent   Psychomotor / Gait balanced;steady   Activities of Daily Living   Hygiene/Grooming independent   Oral Hygiene independent   Dress independent   Laundry with supervision   Room Organization independent   Activity   Activity Assistance Provided independent   Behavioral Health Interventions   Depression maintain safety precautions     "
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
## 2018-03-27 NOTE — DISCHARGE INSTRUCTIONS
Behavioral Discharge Planning and Instructions      Summary:  You were admitted on 3/18/2018  due to Suicidal Ideations and Chemical Use Issues.  You were treated by Dr. Francis Paredes MD and Dr. Ken Leone MD and discharged on 3/27/18  from [facility].    Principal Diagnosis: Polysubstance Use, Generalized Anxiety Disorder      Health Care Follow-up Appointments:   You are discharging to [facility] for CD treatment  [facility] [address] [place] [address]  Phone:  [phone]  [facility] will send discharge summary to Fax: [phone]    Follow up with your therapist: Sarahy @ [facility] [phone]     Attend all scheduled appointments with your outpatient providers. Call at least 24 hours in advance if you need to reschedule an appointment to ensure continued access to your outpatient providers.   Major Treatments, Procedures and Findings:  You were provided with: a psychiatric assessment, assessed for medical stability, medication evaluation and/or management, group therapy and milieu management    Symptoms to Report: feeling more aggressive, increased confusion, losing more sleep, mood getting worse or thoughts of suicide    Early warning signs can include: increased depression or anxiety sleep disturbances increased thoughts or behaviors of suicide or self-harm  increased unusual thinking, such as paranoia or hearing voices    Safety and Wellness:  Take all medicines as directed.  Make no changes unless your doctor suggests them.      Follow treatment recommendations.  Refrain from alcohol and non-prescribed drugs.  Ask your support system to help you reduce your access to items that could harm yourself or others. If there is a concern for safety, call 911.    Resources:   Crisis Intervention: 301.431.8334 or 938-470-2135 (TTY: 615.931.6766).  Call anytime for help.  National [place] on Mental Illness (www.mn.latrell.org): 840.523.3108 or  "409.430.9646.  Alcoholics Anonymous (www.alcoholics-anonymous.org): Check your phone book for your local chapter.  Suicide Awareness Voices of Education (SAVE) (www.save.org): 079-676-OQML (9051)  National Suicide Prevention Line (www.mentalhealthmn.org): 392-026-SFDW (0842)  Mental Health Consumer/Survivor Network of [place] (www.mhcsn.net): 553.388.4229 or 907-469-7990  Mental Health Association of [place] (www.mentalhealth.org): 609.124.5833 or 753-793-9834  Self- Management and Recovery Training., [employer]-- Toll free: 298.249.8974  www.[employer]  [facility] Crisis (COPE) Response - Adult [phone]  Text 4 Life: txt \"LIFE\" to 31475 for immediate support and crisis intervention  Crisis text line: Text \"START\" to 646-912. Free, confidential, 24/7.    The treatment team has appreciated the opportunity to work with you.     Please take care and make your recovery a daily recovery.   If you have any questions or concerns our unit number is [phone].  Thank you.     "

## 2018-03-27 NOTE — PROGRESS NOTES
Phone call with Trevon from [facility] ([phone]) who reported that [facility] has a bed available today and [facility] will pick him up at 3:00 pm.

## 2018-03-27 NOTE — DISCHARGE SUMMARY
Discharge Note    Discharge instructions reviewed with patient, paper works signed, including future appointments. Pt left with all of his belongings. He denied depressive symptoms, suicidal and homocidal ideations.

## 2018-07-09 ENCOUNTER — ALLIED HEALTH/NURSE VISIT (OUTPATIENT)
Dept: INFECTIOUS DISEASES | Facility: CLINIC | Age: 33
End: 2018-07-09
Payer: COMMERCIAL

## 2018-07-09 DIAGNOSIS — Z21 HUMAN IMMUNODEFICIENCY VIRUS I INFECTION (H): ICD-10-CM

## 2018-07-09 DIAGNOSIS — Z77.21 PERSONAL HISTORY OF EXPOSURE TO POTENTIALLY HAZARDOUS BODY FLUIDS: ICD-10-CM

## 2018-07-09 DIAGNOSIS — Z77.21 PERSONAL HISTORY OF EXPOSURE TO POTENTIALLY HAZARDOUS BODY FLUIDS: Primary | ICD-10-CM

## 2018-07-09 DIAGNOSIS — Z21 HUMAN IMMUNODEFICIENCY VIRUS I INFECTION (H): Primary | ICD-10-CM

## 2018-07-09 LAB
ALBUMIN SERPL-MCNC: 4.1 G/DL (ref 3.4–5)
ALP SERPL-CCNC: 122 U/L (ref 40–150)
ALT SERPL W P-5'-P-CCNC: 24 U/L (ref 0–70)
ANION GAP SERPL CALCULATED.3IONS-SCNC: 6 MMOL/L (ref 3–14)
AST SERPL W P-5'-P-CCNC: 24 U/L (ref 0–45)
BASOPHILS # BLD AUTO: 0 10E9/L (ref 0–0.2)
BASOPHILS NFR BLD AUTO: 0.6 %
BILIRUB SERPL-MCNC: 3.6 MG/DL (ref 0.2–1.3)
BUN SERPL-MCNC: 8 MG/DL (ref 7–30)
CALCIUM SERPL-MCNC: 9 MG/DL (ref 8.5–10.1)
CHLORIDE SERPL-SCNC: 104 MMOL/L (ref 94–109)
CO2 SERPL-SCNC: 29 MMOL/L (ref 20–32)
CREAT SERPL-MCNC: 0.82 MG/DL (ref 0.66–1.25)
DIFFERENTIAL METHOD BLD: NORMAL
EOSINOPHIL # BLD AUTO: 0.1 10E9/L (ref 0–0.7)
EOSINOPHIL NFR BLD AUTO: 1.8 %
ERYTHROCYTE [DISTWIDTH] IN BLOOD BY AUTOMATED COUNT: 12.4 % (ref 10–15)
GFR SERPL CREATININE-BSD FRML MDRD: >90 ML/MIN/1.7M2
GLUCOSE SERPL-MCNC: 89 MG/DL (ref 70–99)
HCT VFR BLD AUTO: 45.8 % (ref 40–53)
HGB BLD-MCNC: 15.9 G/DL (ref 13.3–17.7)
IMM GRANULOCYTES # BLD: 0 10E9/L (ref 0–0.4)
IMM GRANULOCYTES NFR BLD: 0 %
LYMPHOCYTES # BLD AUTO: 2.6 10E9/L (ref 0.8–5.3)
LYMPHOCYTES NFR BLD AUTO: 48.6 %
MCH RBC QN AUTO: 30.6 PG (ref 26.5–33)
MCHC RBC AUTO-ENTMCNC: 34.7 G/DL (ref 31.5–36.5)
MCV RBC AUTO: 88 FL (ref 78–100)
MONOCYTES # BLD AUTO: 0.5 10E9/L (ref 0–1.3)
MONOCYTES NFR BLD AUTO: 8.5 %
NEUTROPHILS # BLD AUTO: 2.2 10E9/L (ref 1.6–8.3)
NEUTROPHILS NFR BLD AUTO: 40.5 %
NRBC # BLD AUTO: 0 10*3/UL
NRBC BLD AUTO-RTO: 0 /100
PLATELET # BLD AUTO: 267 10E9/L (ref 150–450)
POTASSIUM SERPL-SCNC: 4.1 MMOL/L (ref 3.4–5.3)
PROT SERPL-MCNC: 7.9 G/DL (ref 6.8–8.8)
RBC # BLD AUTO: 5.2 10E12/L (ref 4.4–5.9)
SODIUM SERPL-SCNC: 139 MMOL/L (ref 133–144)
WBC # BLD AUTO: 5.4 10E9/L (ref 4–11)

## 2018-07-09 PROCEDURE — 86359 T CELLS TOTAL COUNT: CPT | Performed by: COLON & RECTAL SURGERY

## 2018-07-09 PROCEDURE — 86593 SYPHILIS TEST NON-TREP QUANT: CPT | Performed by: COLON & RECTAL SURGERY

## 2018-07-09 PROCEDURE — 86592 SYPHILIS TEST NON-TREP QUAL: CPT | Performed by: COLON & RECTAL SURGERY

## 2018-07-09 PROCEDURE — 87536 HIV-1 QUANT&REVRSE TRNSCRPJ: CPT | Performed by: COLON & RECTAL SURGERY

## 2018-07-09 PROCEDURE — 87491 CHLMYD TRACH DNA AMP PROBE: CPT | Performed by: COLON & RECTAL SURGERY

## 2018-07-09 PROCEDURE — 85025 COMPLETE CBC W/AUTO DIFF WBC: CPT | Performed by: COLON & RECTAL SURGERY

## 2018-07-09 PROCEDURE — 87591 N.GONORRHOEAE DNA AMP PROB: CPT | Performed by: COLON & RECTAL SURGERY

## 2018-07-09 PROCEDURE — 36415 COLL VENOUS BLD VENIPUNCTURE: CPT | Performed by: COLON & RECTAL SURGERY

## 2018-07-09 PROCEDURE — 86360 T CELL ABSOLUTE COUNT/RATIO: CPT | Performed by: COLON & RECTAL SURGERY

## 2018-07-09 PROCEDURE — 40000269 ZZH STATISTIC NO CHARGE FACILITY FEE: Mod: ZF

## 2018-07-09 PROCEDURE — 86780 TREPONEMA PALLIDUM: CPT | Performed by: COLON & RECTAL SURGERY

## 2018-07-09 PROCEDURE — 80053 COMPREHEN METABOLIC PANEL: CPT | Performed by: COLON & RECTAL SURGERY

## 2018-07-09 NOTE — PROGRESS NOTES
Per [facility] Ambulatory Care Protocol, Pt is due for routine labs based on disease state or monitoring of medications. Lab orders entered. Per [facility] Ambulatory Care Protocol, Pt is symptomatic for a sexually transmitted infection or pt has known personal history of exposure to potentially hazardous body fluids, therefor lab orders entered today for STD testing.   Etelvina Rose RN

## 2018-07-09 NOTE — MR AVS SNAPSHOT
After Visit Summary   7/9/2018    Tejinder Doyle    MRN: [id number]           Patient Information     Date Of Birth          1985        Visit Information        Provider Department      7/9/2018 1:30 PM Nurse, [facility] Diseases        Today's Diagnoses     Human immunodeficiency virus I infection (H)    -  1    Personal history of exposure to potentially hazardous body fluids           Follow-ups after your visit        Your next 10 appointments already scheduled     Jul 12, 2018  3:30 PM CDT   (Arrive by 3:15 PM)   Return Visit with Melania Fried MD   [facility] ([facility])    [address]  [address]  [facility] [address] [phone]              Future tests that were ordered for you today     Open Future Orders        Priority Expected Expires Ordered    Treponema Abs w Reflex to RPR and Titer Routine  1/5/2019 7/9/2018            Who to contact     If you have questions or need follow up information about today's clinic visit or your schedule please contact [facility] INFECT[facility] DISE[facility] directly at [phone].  Normal or non-critical lab and imaging results will be communicated to you by Adilityhart, letter or phone within 4 business days after the clinic has received the results. If you do not hear from us within 7 days, please contact the clinic through Archevost or phone. If you have a critical or abnormal lab result, we will notify you by phone as soon as possible.  Submit refill requests through SumUp or call your pharmacy and they will forward the refill request to us. Please allow 3 business days for your refill to be completed.          Additional Information About Your Visit        Adilityhart Information     SumUp gives you secure access to your electronic health record. If you see a primary care provider, you can also send messages to your  care team and make appointments. If you have questions, please call your primary care clinic.  If you do not have a primary care provider, please call [phone] and they will assist you.        Care EveryWhere ID     This is your Care EveryWhere ID. This could be used by other organizations to access your [facility] medical records  [id number]         Blood Pressure from Last 3 Encounters:   03/26/18 109/60   12/21/17 113/75   04/27/17 123/69    Weight from Last 3 Encounters:   03/27/18 73.1 kg (161 lb 1.6 oz)   12/21/17 79.9 kg (176 lb 1.6 oz)   04/27/17 80.6 kg (177 lb 9.6 oz)              Today, you had the following     No orders found for display       Primary Care Provider Office Phone #    [facility] [phone]       No address on file        Equal Access to Services     LORNA SHANNON : Hadii annabel Pearce, waaxda luirene, qaybta kaalmada rafael, leon nunez . So [facility] [phone].    ATENCIÓN: Si habla español, tiene a patel disposición servicios gratuitos de asistencia lingüística. Rosa al [phone].    We comply with applicable federal civil rights laws and [place] laws. We do not discriminate on the basis of race, color, national origin, age, disability, sex, sexual orientation, or gender identity.            Thank you!     Thank you for choosing [facility] AND INFECTIOUS DISEASES  for your care. Our goal is always to provide you with excellent care. Hearing back from our patients is one way we can continue to improve our services. Please take a few minutes to complete the written survey that you may receive in the mail after your visit with us. Thank you!             Your Updated Medication List - Protect others around you: Learn how to safely use, store and throw away your medicines at www.disposemymeds.org.          This list is accurate as of 7/9/18  4:03 PM.  Always use your most recent med list.                   Brand  Name Dispense Instructions for use Diagnosis    amoxicillin-clavulanate 875-125 MG per tablet    AUGMENTIN    7 tablet    Take 1 tablet by mouth every 12 hours    HIV (human immunodeficiency virus infection) (H)       atazanavir-cobicistat 300-150 MG table    EVOTAZ    90 tablet    Take 1 tablet by mouth daily    HIV (human immunodeficiency virus infection) (H)       benzocaine 20 % Gel gel    ORAJEL MAXIMUM STRENGTH    1 Bottle    Take by mouth 4 times daily as needed for moderate pain    HIV (human immunodeficiency virus infection) (H)       chlorhexidine 0.12 % solution    PERIDEX    473 mL    Swish and spit 15 mLs in mouth 2 times daily    HIV (human immunodeficiency virus infection) (H)       emtricitabine-tenofovir -25 MG per tablet    DESCOVY    90 tablet    Take 1 tablet by mouth daily    Asymptomatic human immunodeficiency virus (HIV) infection status (H)       hydrOXYzine 50 MG tablet    ATARAX    90 tablet    Take 1 tablet (50 mg) by mouth 4 times daily as needed for itching or anxiety    Bipolar 2 disorder (H)       lamoTRIgine 100 MG 24 hr tablet    LaMICtal    60 tablet    Take 2 tablets (200 mg) by mouth daily    Bipolar 2 disorder (H)       nicotine polacrilex 4 MG lozenge     135 tablet    Place 1-2 lozenges (4-8 mg) inside cheek every hour as needed for other (nicotine withdrawal symptoms)    Cigarette nicotine dependence without complication       propranolol 10 MG tablet    INDERAL    90 tablet    Take 1 tablet (10 mg) by mouth 3 times daily    Bipolar 2 disorder (H)       traZODone 100 MG tablet    DESYREL    60 tablet    Take 1 tablet (100 mg) by mouth nightly as needed for sleep    Bipolar 2 disorder (H)       venlafaxine 75 MG Tb24 24 hr tablet    EFFEXOR-ER    30 each    Take 1 tablet (75 mg) by mouth daily (with breakfast)    Bipolar 2 disorder (H)

## 2018-07-09 NOTE — NURSING NOTE
STI swabs done per Dr. Fried orders, pt's first name, last and  verified prior to specimen collection, pt tolerated well    Divine Graves CMA    2018 4:00 PM

## 2018-07-10 ENCOUNTER — TELEPHONE (OUTPATIENT)
Dept: INFECTIOUS DISEASES | Facility: CLINIC | Age: 33
End: 2018-07-10

## 2018-07-10 LAB
C TRACH DNA SPEC QL NAA+PROBE: NEGATIVE
C TRACH DNA SPEC QL NAA+PROBE: NEGATIVE
C TRACH DNA SPEC QL NAA+PROBE: POSITIVE
CD3 CELLS # BLD: 1887 CELLS/UL (ref 603–2990)
CD3 CELLS NFR BLD: 75 % (ref 49–84)
CD3+CD4+ CELLS # BLD: 729 CELLS/UL (ref 441–2156)
CD3+CD4+ CELLS NFR BLD: 29 % (ref 28–63)
CD3+CD4+ CELLS/CD3+CD8+ CLL BLD: 0.67 % (ref 1.4–2.6)
CD3+CD8+ CELLS # BLD: 1088 CELLS/UL (ref 125–1312)
CD3+CD8+ CELLS NFR BLD: 43 % (ref 10–40)
HIV1 RNA # PLAS NAA DL=20: <20 {COPIES}/ML
HIV1 RNA SERPL NAA+PROBE-LOG#: <1.3 {LOG_COPIES}/ML
IFC SPECIMEN: ABNORMAL
N GONORRHOEA DNA SPEC QL NAA+PROBE: NEGATIVE
SPECIMEN SOURCE: ABNORMAL
SPECIMEN SOURCE: NORMAL

## 2018-07-10 NOTE — TELEPHONE ENCOUNTER
Spoke w/pt via phone and notified him that rectal swab was positive for chlamydia. He would like to just get treated at his appt with Dr. Fried on Thursday, 7/12.  Serenity Vang RN

## 2018-07-11 LAB
RPR SER QL: REACTIVE
RPR SER-TITR: 1 {TITER}
T PALLIDUM AB SER QL: REACTIVE

## 2018-07-12 ENCOUNTER — DOCUMENTATION ONLY (OUTPATIENT)
Dept: INFECTIOUS DISEASES | Facility: CLINIC | Age: 33
End: 2018-07-12

## 2018-07-12 ENCOUNTER — APPOINTMENT (OUTPATIENT)
Dept: INFECTIOUS DISEASES | Facility: CLINIC | Age: 33
End: 2018-07-12
Attending: COLON & RECTAL SURGERY
Payer: COMMERCIAL

## 2018-07-12 DIAGNOSIS — A74.9 CHLAMYDIA: Primary | ICD-10-CM

## 2018-07-12 RX ORDER — AZITHROMYCIN 500 MG/1
1000 TABLET, FILM COATED ORAL ONCE
Qty: 2 TABLET | Refills: 0 | Status: SHIPPED | OUTPATIENT
Start: 2018-07-12 | End: 2018-07-12

## 2018-10-09 DIAGNOSIS — F31.81 BIPOLAR 2 DISORDER (H): ICD-10-CM

## 2018-10-09 RX ORDER — MIRTAZAPINE 15 MG/1
30 TABLET, FILM COATED ORAL AT BEDTIME
Qty: 30 TABLET | Refills: 3 | Status: SHIPPED | OUTPATIENT
Start: 2018-10-09 | End: 2020-09-03

## 2018-10-09 RX ORDER — LAMOTRIGINE 100 MG/1
200 TABLET, EXTENDED RELEASE ORAL DAILY
Qty: 60 TABLET | Refills: 3 | Status: SHIPPED | OUTPATIENT
Start: 2018-10-09 | End: 2018-11-26

## 2018-10-09 RX ORDER — VENLAFAXINE HYDROCHLORIDE 75 MG/1
150 TABLET, EXTENDED RELEASE ORAL
Qty: 30 EACH | Refills: 3 | Status: SHIPPED | OUTPATIENT
Start: 2018-10-09

## 2018-10-11 ENCOUNTER — MEDICAL CORRESPONDENCE (OUTPATIENT)
Dept: HEALTH INFORMATION MANAGEMENT | Facility: CLINIC | Age: 33
End: 2018-10-11

## 2018-11-13 ENCOUNTER — TELEPHONE (OUTPATIENT)
Dept: INFECTIOUS DISEASES | Facility: CLINIC | Age: 33
End: 2018-11-13

## 2018-11-13 NOTE — TELEPHONE ENCOUNTER
Called patient in regards to request from patient for lamictal medication refill. Writer asked patient if he normally has a psychiatric doctor or PCP who usually fills this medication as Dr. Garnett doesn't typically fill this medication for him and she is also currently on a  leave of absence. Patient stated he does not have a psychiatric doctor or a PCP currently because he has not had time to see one yet. Patient stated Dr. Fried has filled it once or twice before because he has been stable on it for quite a while. Patient was last seen by Dr. Fried on 07/12/2018. Writer will see if another DrSelma Is able to fill this prescription for patient.      Lindsay Manzo RN

## 2018-11-26 ENCOUNTER — TELEPHONE (OUTPATIENT)
Dept: INFECTIOUS DISEASES | Facility: CLINIC | Age: 33
End: 2018-11-26

## 2018-11-26 DIAGNOSIS — F31.81 BIPOLAR 2 DISORDER (H): ICD-10-CM

## 2018-11-26 DIAGNOSIS — F31.81 BIPOLAR 2 DISORDER (H): Primary | ICD-10-CM

## 2018-11-26 RX ORDER — LAMOTRIGINE 100 MG/1
200 TABLET, EXTENDED RELEASE ORAL DAILY
Qty: 60 TABLET | Refills: 3 | Status: SHIPPED | OUTPATIENT
Start: 2018-11-26 | End: 2018-11-26 | Stop reason: ALTCHOICE

## 2018-11-26 RX ORDER — LAMOTRIGINE 100 MG/1
100 TABLET ORAL 2 TIMES DAILY
Qty: 60 TABLET | Refills: 0 | Status: SHIPPED | OUTPATIENT
Start: 2018-11-26 | End: 2019-01-04

## 2018-11-26 RX ORDER — LAMOTRIGINE 100 MG/1
TABLET ORAL
Qty: 60 TABLET | Refills: 3 | Status: CANCELLED | OUTPATIENT
Start: 2018-11-26

## 2018-11-26 RX ORDER — LAMOTRIGINE 100 MG/1
100 TABLET ORAL 2 TIMES DAILY
Qty: 60 TABLET | COMMUNITY
Start: 2018-11-26 | End: 2018-11-26

## 2018-11-26 NOTE — TELEPHONE ENCOUNTER
11/26/2018 at 1020  Discussed question below about medication with pharmacist. Refill request for prescription will be sent to psychiatrist that usually prescribes lamotrigine IR tablets. They will let us know if they have any troubles getting new prescription sent.    Lindsay Manzo RN              [facility] Call Center    Phone Message    May a detailed message be left on voicemail: yes    Reason for Call: Medication Question or concern regarding medication   Prescription Clarification  Name of Medication: lamoTRIgine (LAMICTAL) 100 MG 24 hr tablet  Prescribing Provider: [facility]   Pharmacy: [place], [place] - [address] [place] [address] [phone]   What on the order needs clarification? Pharmacy believes that wrong Rx was sent. Pt has never been on the extended release product before. Pt has only be on the immediate relief lamotrigine. Please f/u with pharmacy.     Action Taken: Message routed to:  [facility] infectious disease adult [facility]

## 2018-11-26 NOTE — TELEPHONE ENCOUNTER
Discussed question below about medication with pharmacist. Refill request for prescription will be sent to psychiatrist that usually prescribes lamotrigine IR tablets. They will let us know if they have any troubles getting new prescription sent.     Lindsay Manzo RN

## 2019-01-04 DIAGNOSIS — F31.81 BIPOLAR 2 DISORDER (H): ICD-10-CM

## 2019-01-07 RX ORDER — LAMOTRIGINE 100 MG/1
TABLET ORAL
Qty: 60 TABLET | Refills: 0 | Status: SHIPPED | OUTPATIENT
Start: 2019-01-07 | End: 2020-09-03

## 2019-01-17 DIAGNOSIS — F31.81 BIPOLAR 2 DISORDER (H): ICD-10-CM

## 2019-01-17 RX ORDER — LAMOTRIGINE 100 MG/1
TABLET ORAL
Qty: 60 TABLET | Refills: 0 | OUTPATIENT
Start: 2019-01-17

## 2019-04-30 DIAGNOSIS — Z21 HIV (HUMAN IMMUNODEFICIENCY VIRUS INFECTION) (H): ICD-10-CM

## 2019-04-30 DIAGNOSIS — Z21 ASYMPTOMATIC HUMAN IMMUNODEFICIENCY VIRUS (HIV) INFECTION STATUS (H): ICD-10-CM

## 2019-04-30 NOTE — TELEPHONE ENCOUNTER
Reordered descovy and evotaz for 1 months worth. Notified Tate, HIV , that pt needs annual B20 appt scheduled.    Lindsay Manzo RN

## 2019-07-05 ENCOUNTER — TELEPHONE (OUTPATIENT)
Dept: INFECTIOUS DISEASES | Facility: CLINIC | Age: 34
End: 2019-07-05

## 2019-07-05 DIAGNOSIS — Z77.21 PERSONAL HISTORY OF EXPOSURE TO POTENTIALLY HAZARDOUS BODY FLUIDS: ICD-10-CM

## 2019-07-05 DIAGNOSIS — Z21 HIV (HUMAN IMMUNODEFICIENCY VIRUS INFECTION) (H): Primary | ICD-10-CM

## 2019-07-05 NOTE — TELEPHONE ENCOUNTER
[facility] Call Center    Phone Message    May a detailed message be left on voicemail: yes    Reason for Call: Order(s): Other:   Reason for requested: LABS prior to appt   Date needed: 7/25/2019   Provider name: Melania Fried      Action Taken: Message routed to:  [facility] ([facility]): ID

## 2019-07-08 NOTE — TELEPHONE ENCOUNTER
Per [facility] Ambulatory Care Protocol, Pt is due for routine labs based on disease state or monitoring of medications. Lab orders entered per clinic protocol.  Serenity Vang RN

## 2019-07-24 ENCOUNTER — TELEPHONE (OUTPATIENT)
Dept: INFECTIOUS DISEASES | Facility: CLINIC | Age: 34
End: 2019-07-24

## 2019-07-25 ENCOUNTER — OFFICE VISIT (OUTPATIENT)
Dept: INFECTIOUS DISEASES | Facility: CLINIC | Age: 34
End: 2019-07-25
Attending: COLON & RECTAL SURGERY
Payer: COMMERCIAL

## 2019-07-25 VITALS
HEART RATE: 95 BPM | WEIGHT: 177.5 LBS | DIASTOLIC BLOOD PRESSURE: 74 MMHG | TEMPERATURE: 98 F | BODY MASS INDEX: 26.9 KG/M2 | OXYGEN SATURATION: 96 % | SYSTOLIC BLOOD PRESSURE: 110 MMHG | HEIGHT: 68 IN

## 2019-07-25 DIAGNOSIS — B20 HUMAN IMMUNODEFICIENCY VIRUS (HIV) DISEASE (H): Primary | ICD-10-CM

## 2019-07-25 DIAGNOSIS — F41.1 GAD (GENERALIZED ANXIETY DISORDER): ICD-10-CM

## 2019-07-25 DIAGNOSIS — F15.10 METHAMPHETAMINE ABUSE (H): ICD-10-CM

## 2019-07-25 DIAGNOSIS — B20 HUMAN IMMUNODEFICIENCY VIRUS (HIV) DISEASE (H): ICD-10-CM

## 2019-07-25 DIAGNOSIS — Z21 HIV (HUMAN IMMUNODEFICIENCY VIRUS INFECTION) (H): ICD-10-CM

## 2019-07-25 DIAGNOSIS — Z77.21 PERSONAL HISTORY OF EXPOSURE TO POTENTIALLY HAZARDOUS BODY FLUIDS: ICD-10-CM

## 2019-07-25 DIAGNOSIS — Z21 ASYMPTOMATIC HUMAN IMMUNODEFICIENCY VIRUS (HIV) INFECTION STATUS (H): ICD-10-CM

## 2019-07-25 LAB
ALBUMIN SERPL-MCNC: 4.3 G/DL (ref 3.4–5)
ALP SERPL-CCNC: 82 U/L (ref 40–150)
ALT SERPL W P-5'-P-CCNC: 24 U/L (ref 0–70)
ANION GAP SERPL CALCULATED.3IONS-SCNC: 4 MMOL/L (ref 3–14)
AST SERPL W P-5'-P-CCNC: 22 U/L (ref 0–45)
BASOPHILS # BLD AUTO: 0 10E9/L (ref 0–0.2)
BASOPHILS NFR BLD AUTO: 0.5 %
BILIRUB SERPL-MCNC: 2 MG/DL (ref 0.2–1.3)
BUN SERPL-MCNC: 12 MG/DL (ref 7–30)
CALCIUM SERPL-MCNC: 8.8 MG/DL (ref 8.5–10.1)
CHLORIDE SERPL-SCNC: 105 MMOL/L (ref 94–109)
CO2 SERPL-SCNC: 28 MMOL/L (ref 20–32)
CREAT SERPL-MCNC: 0.79 MG/DL (ref 0.66–1.25)
DIFFERENTIAL METHOD BLD: NORMAL
EOSINOPHIL # BLD AUTO: 0.1 10E9/L (ref 0–0.7)
EOSINOPHIL NFR BLD AUTO: 1.8 %
ERYTHROCYTE [DISTWIDTH] IN BLOOD BY AUTOMATED COUNT: 12.9 % (ref 10–15)
GFR SERPL CREATININE-BSD FRML MDRD: >90 ML/MIN/{1.73_M2}
GLUCOSE SERPL-MCNC: 89 MG/DL (ref 70–99)
HCT VFR BLD AUTO: 45.4 % (ref 40–53)
HGB BLD-MCNC: 15.7 G/DL (ref 13.3–17.7)
IMM GRANULOCYTES # BLD: 0 10E9/L (ref 0–0.4)
IMM GRANULOCYTES NFR BLD: 0 %
LYMPHOCYTES # BLD AUTO: 2.2 10E9/L (ref 0.8–5.3)
LYMPHOCYTES NFR BLD AUTO: 39.4 %
MCH RBC QN AUTO: 31.2 PG (ref 26.5–33)
MCHC RBC AUTO-ENTMCNC: 34.6 G/DL (ref 31.5–36.5)
MCV RBC AUTO: 90 FL (ref 78–100)
MONOCYTES # BLD AUTO: 0.5 10E9/L (ref 0–1.3)
MONOCYTES NFR BLD AUTO: 8.8 %
NEUTROPHILS # BLD AUTO: 2.7 10E9/L (ref 1.6–8.3)
NEUTROPHILS NFR BLD AUTO: 49.5 %
NRBC # BLD AUTO: 0 10*3/UL
NRBC BLD AUTO-RTO: 0 /100
PLATELET # BLD AUTO: 242 10E9/L (ref 150–450)
POTASSIUM SERPL-SCNC: 3.9 MMOL/L (ref 3.4–5.3)
PROT SERPL-MCNC: 7.6 G/DL (ref 6.8–8.8)
RBC # BLD AUTO: 5.03 10E12/L (ref 4.4–5.9)
SODIUM SERPL-SCNC: 136 MMOL/L (ref 133–144)
WBC # BLD AUTO: 5.5 10E9/L (ref 4–11)

## 2019-07-25 PROCEDURE — 86359 T CELLS TOTAL COUNT: CPT | Performed by: COLON & RECTAL SURGERY

## 2019-07-25 PROCEDURE — 80053 COMPREHEN METABOLIC PANEL: CPT | Performed by: COLON & RECTAL SURGERY

## 2019-07-25 PROCEDURE — G0463 HOSPITAL OUTPT CLINIC VISIT: HCPCS | Mod: ZF

## 2019-07-25 PROCEDURE — 87536 HIV-1 QUANT&REVRSE TRNSCRPJ: CPT | Performed by: COLON & RECTAL SURGERY

## 2019-07-25 PROCEDURE — 36415 COLL VENOUS BLD VENIPUNCTURE: CPT | Performed by: COLON & RECTAL SURGERY

## 2019-07-25 PROCEDURE — 86803 HEPATITIS C AB TEST: CPT | Performed by: COLON & RECTAL SURGERY

## 2019-07-25 PROCEDURE — 86780 TREPONEMA PALLIDUM: CPT | Performed by: COLON & RECTAL SURGERY

## 2019-07-25 PROCEDURE — 85025 COMPLETE CBC W/AUTO DIFF WBC: CPT | Performed by: COLON & RECTAL SURGERY

## 2019-07-25 PROCEDURE — 86592 SYPHILIS TEST NON-TREP QUAL: CPT | Performed by: COLON & RECTAL SURGERY

## 2019-07-25 PROCEDURE — 86360 T CELL ABSOLUTE COUNT/RATIO: CPT | Performed by: COLON & RECTAL SURGERY

## 2019-07-25 RX ORDER — CLONAZEPAM 1 MG/1
1 TABLET ORAL DAILY
COMMUNITY
Start: 2019-07-08 | End: 2019-07-25

## 2019-07-25 RX ORDER — BACLOFEN 20 MG/1
20 TABLET ORAL 2 TIMES DAILY
COMMUNITY
Start: 2019-06-29 | End: 2020-09-03

## 2019-07-25 RX ORDER — LAMOTRIGINE 200 MG/1
200 TABLET ORAL DAILY
COMMUNITY
Start: 2019-06-28 | End: 2019-08-07

## 2019-07-25 RX ORDER — CLONAZEPAM 1 MG/1
1 TABLET ORAL DAILY
Qty: 20 TABLET | Refills: 0 | Status: SHIPPED | OUTPATIENT
Start: 2019-07-25 | End: 2020-09-03

## 2019-07-25 ASSESSMENT — PAIN SCALES - GENERAL: PAINLEVEL: NO PAIN (0)

## 2019-07-25 ASSESSMENT — MIFFLIN-ST. JEOR: SCORE: 1724.63

## 2019-07-25 NOTE — NURSING NOTE
"Chief Complaint   Patient presents with     RECHECK     B20       /74 (BP Location: Right arm, Patient Position: Sitting, Cuff Size: Adult Regular)   Pulse 95   Ht 1.727 m (5' 8\")   Wt 80.5 kg (177 lb 8 oz)   SpO2 96%   BMI 26.99 kg/m    Betsy Forde CMA    7/25/2019 3:07 PM      "

## 2019-07-25 NOTE — PROGRESS NOTES
REASON FOR VISIT: Follow up HIV.     HISTORY OF PRESENT ILLNESS:  Mr. Doyle is a very pleasant 33 year-old gentleman with HIV/AIDS (CD4 alyssia 13) who was last seen formally seen in clinic by me in December, 2017.  He stopped by clinic several times since for lab tests and STI treatment but because of insurance issues, did not see me for a schedule visit.     He unfortunately had a brief relapse of etoh, heroin, and methamphetamines in Feb 2018.  He was admitted to inpatient psychiatry in March, 2018 and had a brief stay.  He has remained in outpatient treatment since and is living in a sober house. He is proud of himself for maintaining sobriety.  He is scheduled to have an initial assessment with a new psychiatrist in several weeks and is looking forward to this.     In terms of his HIV, he has had several periods of being off of meds in the past 1.5 years, most recently a few months ago given insurance issues. He stopped his meds all at once and did not intermittently take them.  Once getting restarted, he denies missing any doses. He has no side effects.      He has no other concerns today. He denies any recent illnesses and has overall been feeling well.     PAST MEDICAL HISTORY:  1. HIV diagnosed in 08/2010, started on Atripla 10/2010. CD4 alyssia 13. Stopped treatment in 9/12 (developed K103 mutation); started Stribild 6/2013.  Off from 10/2014 - 8/2014.  Kaylee/pheno with Ral and Elvitegravir resistance. Boosted atazanavir+ truvada initiated 8/2014.  Stopped winter of 2014. Evotaz + Truvada started 1/2016. Switched to Evotaz + Descovy 4/2017.   2. 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 Off of suppressive valtrex.  3. Generalized anxiety disorder.  4. Methamphetamine and benzodiazepine abuse with multiple relapses.    5. Status post excision of epidermal inclusion cyst, August 2014.     MEDICATIONS:  Current Outpatient Medications   Medication     atazanavir-cobicistat (EVOTAZ) 300-150 MG table     baclofen  "(LIORESAL) 20 MG tablet     clonazePAM (KLONOPIN) 1 MG tablet     emtricitabine-tenofovir AF (DESCOVY) 200-25 MG per tablet     lamoTRIgine (LAMICTAL) 200 MG tablet     mirtazapine (REMERON) 15 MG tablet     venlafaxine (EFFEXOR-ER) 75 MG TB24 24 hr tablet     benzocaine (ORAJEL MAXIMUM STRENGTH) 20 % GEL gel     chlorhexidine (PERIDEX) 0.12 % solution     hydrOXYzine (ATARAX) 50 MG tablet     lamoTRIgine (LAMICTAL) 100 MG tablet     nicotine polacrilex 4 MG lozenge     propranolol (INDERAL) 10 MG tablet     traZODone (DESYREL) 100 MG tablet     No current facility-administered medications for this visit.      FAMILY/SOCIAL HISTORY:  Reviewed.  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 He is living in a sober house in [place].     EXAM:  Vitals: /74 (BP Location: Right arm, Patient Position: Sitting, Cuff Size: Adult Regular)   Pulse 95   Temp 98  F (36.7  C) (Oral)   Ht 1.727 m (5' 8\")   Wt 80.5 kg (177 lb 8 oz)   SpO2 96%   BMI 26.99 kg/m    BMI= Body mass index is 26.99 kg/m .  GEN: Pleasant healthy appearing male in no distress  HEENT: PERRL, no scleral icterus. OP moist and clear.   NECK: Supple, nontender.  CARDIAC: RRR, normal S1/S2, no murmurs or rubs.   RESPIRATORY: Lung fields clear to auscultation bilaterally, no wheezing or crackles.  ABDOMEN: Soft, nontender and nondistended. Normoactive bowel sounds.   EXTREMITIES: No edema.  Warm and well perfused.  SKIN:  Multiple tattoos. No acute rashes/lesions.   Neuro: Grossly intact.   Psych: Affect normal.  Speech fluent and appropriate.      LAB DATA:  Obtained 7/9/18:  CMP remarkable for Tbili 3.6  CBC unremarkable  RPR titer 1  HIV RNA <20  CD4 729  Rectal chlamydia positive    ASSESSMENT AND RECOMMENDATIONS: 33 year old year old male with HIV/AIDS (CD4 alyssia 13) and recurrent methamphetamine/benzodiazepam addiction " returning to clinic for follow up.     1)  HIV/AIDS: Longstanding disease with multiple periods of being off of therapy, generally during times of substance abuse relapse or when insurance issues arise. He has hopefully not developed any new resistance over the past year since he stopped meds completely for brief periods (no intermittent use).    - Checking routine labs today (CBC, CMP, CD4 and VL).   - He was reminded to contact our clinic ASAP if he runs into insurance issues.   - Continue Evotaz/Descovy unless unexpected results.   - See below for routine HIV care.     2)  Methamphetamine/etoh abuse: Multiple prior relapses, most recently Feb 2018. Sober since and living in a sober house. Getting appropriate mental health care (see below).   - Congratulated him on achieving and maintaining sobriety.   - We reassess at follow up.     3) Depression/anxiety/possible bipolar disorder: Stable from a symptomatic perspective but still not optimally controlled.   - Per his request, provided him with a small supply of clonazepam to get in through until he establishes care with a new psychiatrist (checked [facility] prescription monitoring program to ensure that he is not getting script from multiple providers); ideally, he will not stay on benzos long term.    - Has enough of other meds until psych visit in a few weeks.   - If any change in psych meds are made, drug/drug interactions should be assessed for given use of a PI based ARV regimen.     4) Routine HIV care.   a. Prophylaxis.    -None needed given CD4.  b. Infectious Disease screening.   -HCV neg at [facility] 3/17; rechecking today.    -Toxo IgG neg   -GC/chlamydia recently negative at [facility]   -Rechecking RPR today.    -Quant gold negative August 2014; denies potential exposures since.   c. Vaccination status.    -Completed first Twinrix vaccine series April 2011 but did not develop an antibody response to either; repeat series completed 2014.  Seroconverted for Hep B  but not Hep A (unusual since the vaccine is highly immunogenic. Will recheck HAV IgG with next labs.     -Pneumovax 2010; booster 2016   -Prevnar August 2014   -Influenza once available.    -Gardasil 2012   -Tdap August 2014   - Completed Menactra 2016  d. Cardiovascular, bone and renal screening.    -Will check lipids with next blood draw     -BP okay today   -On TAF, no indication for bone density screening   -Cr has been normal   -No longer smoking  e. Cancer screening.    -Anal pap due but would like to hold off until follow up visit.      Tejinder will return to clinic in 6 months, sooner if issues arise.       Melania Fried MD  [phone]

## 2019-07-25 NOTE — LETTER
7/25/2019      RE: Tejinder Doyle  [address]  [place] [place] [address]       REASON FOR VISIT: Follow up HIV.     HISTORY OF PRESENT ILLNESS:  Mr. Doyle is a very pleasant 33 year-old gentleman with HIV/AIDS (CD4 alyssia 13) who was last seen formally seen in clinic by me in December, 2017.  He stopped by clinic several times since for lab tests and STI treatment but because of insurance issues, did not see me for a schedule visit.     He unfortunately had a brief relapse of etoh, heroin, and methamphetamines in Feb 2018.  He was admitted to inpatient psychiatry in March, 2018 and had a brief stay.  He has remained in outpatient treatment since and is living in a sober house. He is proud of himself for maintaining sobriety.  He is scheduled to have an initial assessment with a new psychiatrist in several weeks and is looking forward to this.     In terms of his HIV, he has had several periods of being off of meds in the past 1.5 years, most recently a few months ago given insurance issues. He stopped his meds all at once and did not intermittently take them.  Once getting restarted, he denies missing any doses. He has no side effects.      He has no other concerns today. He denies any recent illnesses and has overall been feeling well.     PAST MEDICAL HISTORY:  1. HIV diagnosed in 08/2010, started on Atripla 10/2010. CD4 alyssia 13. Stopped treatment in 9/12 (developed K103 mutation); started Stribild 6/2013.  Off from 10/2014 - 8/2014.  Kaylee/pheno with Ral and Elvitegravir resistance. Boosted atazanavir+ truvada initiated 8/2014.  Stopped winter of 2014. Evotaz + Truvada started 1/2016. Switched to Evotaz + Descovy 4/2017.   2. 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 Off of suppressive valtrex.  3. Generalized anxiety disorder.  4. Methamphetamine and benzodiazepine abuse with multiple relapses.    5. Status post excision of epidermal inclusion cyst, August 2014.     MEDICATIONS:  Current Outpatient Medications  "  Medication     atazanavir-cobicistat (EVOTAZ) 300-150 MG table     baclofen (LIORESAL) 20 MG tablet     clonazePAM (KLONOPIN) 1 MG tablet     emtricitabine-tenofovir AF (DESCOVY) 200-25 MG per tablet     lamoTRIgine (LAMICTAL) 200 MG tablet     mirtazapine (REMERON) 15 MG tablet     venlafaxine (EFFEXOR-ER) 75 MG TB24 24 hr tablet     benzocaine (ORAJEL MAXIMUM STRENGTH) 20 % GEL gel     chlorhexidine (PERIDEX) 0.12 % solution     hydrOXYzine (ATARAX) 50 MG tablet     lamoTRIgine (LAMICTAL) 100 MG tablet     nicotine polacrilex 4 MG lozenge     propranolol (INDERAL) 10 MG tablet     traZODone (DESYREL) 100 MG tablet     No current facility-administered medications for this visit.      FAMILY/SOCIAL HISTORY:  Reviewed.  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 He is living in a sober house in [place].     EXAM:  Vitals: /74 (BP Location: Right arm, Patient Position: Sitting, Cuff Size: Adult Regular)   Pulse 95   Temp 98  F (36.7  C) (Oral)   Ht 1.727 m (5' 8\")   Wt 80.5 kg (177 lb 8 oz)   SpO2 96%   BMI 26.99 kg/m     BMI= Body mass index is 26.99 kg/m .  GEN: Pleasant healthy appearing male in no distress  HEENT: PERRL, no scleral icterus. OP moist and clear.   NECK: Supple, nontender.  CARDIAC: RRR, normal S1/S2, no murmurs or rubs.   RESPIRATORY: Lung fields clear to auscultation bilaterally, no wheezing or crackles.  ABDOMEN: Soft, nontender and nondistended. Normoactive bowel sounds.   EXTREMITIES: No edema.  Warm and well perfused.  SKIN:  Multiple tattoos. No acute rashes/lesions.   Neuro: Grossly intact.   Psych: Affect normal.  Speech fluent and appropriate.      LAB DATA:  Obtained 7/9/18:  CMP remarkable for Tbili 3.6  CBC unremarkable  RPR titer 1  HIV RNA <20  CD4 729  Rectal chlamydia positive    ASSESSMENT AND RECOMMENDATIONS: 33 year old year old male with " HIV/AIDS (CD4 alyssia 13) and recurrent methamphetamine/benzodiazepam addiction returning to clinic for follow up.     1)  HIV/AIDS: Longstanding disease with multiple periods of being off of therapy, generally during times of substance abuse relapse or when insurance issues arise. He has hopefully not developed any new resistance over the past year since he stopped meds completely for brief periods (no intermittent use).    - Checking routine labs today (CBC, CMP, CD4 and VL).   - He was reminded to contact our clinic ASAP if he runs into insurance issues.   - Continue Evotaz/Descovy unless unexpected results.   - See below for routine HIV care.     2)  Methamphetamine/etoh abuse: Multiple prior relapses, most recently Feb 2018. Sober since and living in a sober house. Getting appropriate mental health care (see below).   - Congratulated him on achieving and maintaining sobriety.   - We reassess at follow up.     3) Depression/anxiety/possible bipolar disorder: Stable from a symptomatic perspective but still not optimally controlled.   - Per his request, provided him with a small supply of clonazepam to get in through until he establishes care with a new psychiatrist (checked [facility] prescription monitoring program to ensure that he is not getting script from multiple providers); ideally, he will not stay on benzos long term.    - Has enough of other meds until psych visit in a few weeks.   - If any change in psych meds are made, drug/drug interactions should be assessed for given use of a PI based ARV regimen.     4) Routine HIV care.   a. Prophylaxis.    -None needed given CD4.  b. Infectious Disease screening.   -HCV neg at [facility] 3/17; rechecking today.    -Toxo IgG neg   -GC/chlamydia recently negative at [facility]   -Rechecking RPR today.    -Quant gold negative August 2014; denies potential exposures since.   c. Vaccination status.    -Completed first Twinrix vaccine series April 2011 but did not develop an  antibody response to either; repeat series completed 2014.  Seroconverted for Hep B but not Hep A (unusual since the vaccine is highly immunogenic. Will recheck HAV IgG with next labs.     -Pneumovax 2010; booster 2016   -Prevnar August 2014   -Influenza once available.    -Gardasil 2012   -Tdap August 2014   - Completed Menactra 2016  d. Cardiovascular, bone and renal screening.    -Will check lipids with next blood draw     -BP okay today   -On TAF, no indication for bone density screening   -Cr has been normal   -No longer smoking  e. Cancer screening.    -Anal pap due but would like to hold off until follow up visit.      Tejinder will return to clinic in 6 months, sooner if issues arise.       Melania Fried MD  [phone]            Melania Fried MD

## 2019-07-25 NOTE — LETTER
Date:July 29, 2019      Patient was self referred, no letter generated. Do not send.        [facility] Health Information

## 2019-07-26 LAB
CD3 CELLS # BLD: 1622 CELLS/UL (ref 603–2990)
CD3 CELLS NFR BLD: 74 % (ref 49–84)
CD3+CD4+ CELLS # BLD: 583 CELLS/UL (ref 441–2156)
CD3+CD4+ CELLS NFR BLD: 26 % (ref 28–63)
CD3+CD4+ CELLS/CD3+CD8+ CLL BLD: 0.58 % (ref 1.4–2.6)
CD3+CD8+ CELLS # BLD: 981 CELLS/UL (ref 125–1312)
CD3+CD8+ CELLS NFR BLD: 45 % (ref 10–40)
HCV AB SERPL QL IA: NONREACTIVE
IFC SPECIMEN: ABNORMAL
RPR SER QL: NONREACTIVE
T PALLIDUM AB SER QL: REACTIVE

## 2019-07-28 LAB
RPR SER QL: NONREACTIVE
T PALLIDUM AB SER QL AGGL: REACTIVE

## 2019-07-29 ENCOUNTER — TELEPHONE (OUTPATIENT)
Dept: INFECTIOUS DISEASES | Facility: CLINIC | Age: 34
End: 2019-07-29

## 2019-07-29 LAB
HIV1 RNA # PLAS NAA DL=20: <20 {COPIES}/ML
HIV1 RNA SERPL NAA+PROBE-LOG#: <1.3 {LOG_COPIES}/ML

## 2019-07-29 NOTE — TELEPHONE ENCOUNTER
Health Call Center    Phone Message    May a detailed message be left on voicemail: yes    Reason for Call: Other: PT is calling Dr. Melania Fried for the results of his labs. please call the pt back with the lab results, Thank you     Action Taken: Message routed to:  [facility] ([facility]): Infectious Disease

## 2019-08-07 DIAGNOSIS — F31.81 BIPOLAR 2 DISORDER (H): Primary | ICD-10-CM

## 2019-08-07 RX ORDER — LAMOTRIGINE 200 MG/1
200 TABLET ORAL DAILY
Qty: 30 TABLET | Refills: 1 | Status: SHIPPED | OUTPATIENT
Start: 2019-08-07 | End: 2020-09-03

## 2019-10-10 ENCOUNTER — OFFICE VISIT (OUTPATIENT)
Dept: FAMILY MEDICINE | Facility: CLINIC | Age: 34
End: 2019-10-10

## 2019-10-10 VITALS
DIASTOLIC BLOOD PRESSURE: 82 MMHG | TEMPERATURE: 97.7 F | HEIGHT: 68 IN | WEIGHT: 177 LBS | OXYGEN SATURATION: 98 % | BODY MASS INDEX: 26.83 KG/M2 | HEART RATE: 82 BPM | SYSTOLIC BLOOD PRESSURE: 123 MMHG | RESPIRATION RATE: 16 BRPM

## 2019-10-10 DIAGNOSIS — R07.0 THROAT PAIN: Primary | ICD-10-CM

## 2019-10-10 DIAGNOSIS — R59.1 LA (LYMPHADENOPATHY): ICD-10-CM

## 2019-10-10 LAB
INTERNAL QC OK POCT: YES
S PYO AG THROAT QL IA.RAPID: NEGATIVE

## 2019-10-10 PROCEDURE — 87081 CULTURE SCREEN ONLY: CPT | Performed by: NURSE PRACTITIONER

## 2019-10-10 ASSESSMENT — ENCOUNTER SYMPTOMS
DECREASED APPETITE: 0
FEVER: 0
COUGH: 0
TASTE DISTURBANCE: 0
SINUS PAIN: 0
SORE THROAT: 1
LIGHT-HEADEDNESS: 0
SHORTNESS OF BREATH: 0

## 2019-10-10 ASSESSMENT — PAIN SCALES - GENERAL: PAINLEVEL: MODERATE PAIN (4)

## 2019-10-10 ASSESSMENT — MIFFLIN-ST. JEOR: SCORE: 1722.37

## 2019-10-10 NOTE — NURSING NOTE
Chief Complaint   Patient presents with     Pharyngitis     Pt complains of a sore throat for the past day.         Ronal Hartley, EMT on 10/10/2019 at 1:49 PM

## 2019-10-10 NOTE — PATIENT INSTRUCTIONS

## 2019-10-10 NOTE — PROGRESS NOTES
"       HPI       Tejinder Doyle is a 33 year old man who presents with sore throat and headache since this morning. He has been in close contact with someone who was diagnosed with strep. He has not tried anything OTC or other home remedies. He rates constant  pain 4/10 with swallowing.   Chief Complaint   Patient presents with     Pharyngitis     Pt complains of a sore throat for the past day.       Problem, Medication and Allergy Lists were reviewed and updated if needed.    Patient is an established patient of this clinic.          Review of Systems:   Review of Systems     Constitutional:  Negative for fever and decreased appetite.   HENT:  Positive for sore throat. Negative for ear pain, mouth sores, ear discharge, taste disturbance and sinus pain.    Respiratory:   Negative for cough and shortness of breath.    Cardiovascular:  Negative for chest pain and light-headedness.   Neurological:  Negative for light-headedness.               Physical Exam:     Vitals:    10/10/19 1349   BP: 123/82   BP Location: Right arm   Patient Position: Sitting   Cuff Size: Adult Regular   Pulse: 82   Resp: 16   Temp: 97.7  F (36.5  C)   TempSrc: Oral   SpO2: 98%   Weight: 80.3 kg (177 lb)   Height: 1.727 m (5' 8\")     Body mass index is 26.91 kg/m .  Vitals were reviewed and were normal  Blood pressure 123/82, pulse 82, temperature 97.7  F (36.5  C), temperature source Oral, resp. rate 16, height 1.727 m (5' 8\"), weight 80.3 kg (177 lb), SpO2 98 %.       Physical Exam  Constitutional:       Appearance: Normal appearance.   HENT:      Head: Normocephalic and atraumatic.      Right Ear: Tympanic membrane and ear canal normal.      Left Ear: Tympanic membrane and ear canal normal.      Mouth/Throat:      Pharynx: Posterior oropharyngeal erythema present. No oropharyngeal exudate.   Neck:      Musculoskeletal: No muscular tenderness.      Comments: tonsillar adenopathy noted with patient reporting tenderness on " palpation  Cardiovascular:      Rate and Rhythm: Normal rate and regular rhythm.      Pulses: Normal pulses.      Heart sounds: Normal heart sounds.   Pulmonary:      Effort: Pulmonary effort is normal.      Breath sounds: Normal breath sounds.   Skin:     General: Skin is warm.   Neurological:      General: No focal deficit present.      Mental Status: He is alert and oriented to person, place, and time.   Psychiatric:         Mood and Affect: Mood normal.         Behavior: Behavior normal.           Results:      Rapid Strep negative    Assessment and Plan     1. Throat pain    - Rapid strep screen POCT  - Strep group A culture    2.Lmphadenopathy    Total time spent 20 minutes. More than 50% of the time spent with patient on counseling / coordinating his care. Patient  Instructed to take ibuprofen 200-800 mg q 6-8 hours as needed for pain, gargle 1/4 teaspoon of salt in 1/2 cup of warm water after every meal and at bedtime, and to call clinic if no improvement or symptoms worsen. He will be notified of strep culture result.   Options for treatment and follow-up care were reviewed with the patient. Tejinder Doyle  engaged in the decision making process and verbalized understanding of the options discussed and agreed with the final plan.  Tammy Deluca RN. [facility] Nurse Pratitioner Student.  I was present with the NP student who participated in the service and in the documentation of the services provided. I have verified the history and personally performed the physical exam and medical decision making, as documented by the student and edited by me.  Kay Cr, CHANA, CNP

## 2019-10-12 LAB
BACTERIA SPEC CULT: NORMAL
Lab: NORMAL
SPECIMEN SOURCE: NORMAL

## 2020-03-01 ENCOUNTER — HEALTH MAINTENANCE LETTER (OUTPATIENT)
Age: 35
End: 2020-03-01

## 2020-08-11 DIAGNOSIS — Z21 ASYMPTOMATIC HUMAN IMMUNODEFICIENCY VIRUS (HIV) INFECTION STATUS (H): ICD-10-CM

## 2020-08-11 RX ORDER — ATAZANAVIR AND COBICISTAT 300; 150 MG/1; MG/1
1 TABLET ORAL DAILY
Qty: 30 TABLET | Refills: 0 | Status: SHIPPED | OUTPATIENT
Start: 2020-08-11 | End: 2020-09-03

## 2020-08-11 RX ORDER — EMTRICITABINE AND TENOFOVIR ALAFENAMIDE 200; 25 MG/1; MG/1
1 TABLET ORAL DAILY
Qty: 30 TABLET | Refills: 0 | Status: SHIPPED | OUTPATIENT
Start: 2020-08-11 | End: 2020-09-03

## 2020-09-03 ENCOUNTER — VIRTUAL VISIT (OUTPATIENT)
Dept: INFECTIOUS DISEASES | Facility: CLINIC | Age: 35
End: 2020-09-03
Attending: COLON & RECTAL SURGERY
Payer: COMMERCIAL

## 2020-09-03 DIAGNOSIS — Z21 ASYMPTOMATIC HUMAN IMMUNODEFICIENCY VIRUS (HIV) INFECTION STATUS (H): Primary | ICD-10-CM

## 2020-09-03 DIAGNOSIS — F31.9 BIPOLAR 1 DISORDER (H): ICD-10-CM

## 2020-09-03 RX ORDER — DARUNAVIR, COBICISTAT, EMTRICITABINE, AND TENOFOVIR ALAFENAMIDE 800; 150; 200; 10 MG/1; MG/1; MG/1; MG/1
1 TABLET, FILM COATED ORAL DAILY
Qty: 30 TABLET | Refills: 11 | Status: SHIPPED | OUTPATIENT
Start: 2020-09-03 | End: 2021-09-07

## 2020-09-03 ASSESSMENT — PAIN SCALES - GENERAL: PAINLEVEL: NO PAIN (0)

## 2020-09-03 NOTE — LETTER
Date:September 9, 2020      Patient was self referred, no letter generated. Do not send.        [facility] Health Information

## 2020-09-03 NOTE — LETTER
9/3/2020       RE: Tejinder Doyle  [address]  [facility] [address]     Dear Colleague,    Thank you for referring your patient, Tejinder Doyle, to the [facility] AND INFECTIOUS DISEASES at [facility]. Please see a copy of my visit note below.    REASON FOR VISIT: Follow up HIV.     HISTORY OF PRESENT ILLNESS:  Mr. Doyle is a 34 year-old gentleman with HIV/AIDS (CD4 alyssia 13) who was last seen by me in clinic in July, 2019. After this visit he had multiple months of good adherence to ARVs but several months ago stopped taking his meds due to relapse of methamphetamine use. He stopped ARVs completely and did not have any period of intermittent use. About a month ago he had cut down meth use to the point that he felt he could consistently take ARVs. He therefore restarted and reports good adherence since. He continues to use and does not plan on quitting until his mental health has improved.     He reports a number of significant stressors - his family is still involved in a wrongful death lawsuit about his father (his family is pressing charges). He is very worried about COVID-19 and also the political climate of our country. He is also financially struggling - he has been getting unemployment but does not have stable housing (staying in hotels and airbnbs).  He stopped taking nearly all of his psychiatric medications within the last year. He is now only on effexor. He is unhappy with his current mental health team and so has not seen his therapist for a while. He is hoping to establish care for him with someone with expertise in EMDR - he thinks this approach may be more effective for him. Fortunately, he feels that his mental health is stable currently.     He denies any recent acute illnesses. He has noticed scleral icterus over the past month but has no other symptomatic complaints. He continues to have good support from his mother and friends.        PAST MEDICAL HISTORY:  1. HIV diagnosed in 08/2010, started on Atripla 10/2010. CD4 alyssia 13. Stopped treatment in 9/12 (developed K103 mutation); started Stribild 6/2013.  Off from 10/2014 - 8/2014.  Amaury/pheno with Ral and Elvitegravir resistance. Boosted atazanavir+ truvada initiated 8/2014.  Stopped winter of 2014. Evotaz + Truvada started 1/2016. Switched to Evotaz + Descovy 4/2017.  Periods of poor adherence since.   2. 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 Off of suppressive valtrex.  3. Generalized anxiety disorder, depression.  4. Substance use disorder (methamphetamine and benzodiazepines)  5. Status post excision of epidermal inclusion cyst, August 2014.     MEDICATIONS:  Current Outpatient Medications   Medication     Hijsd-Bewvo-Tkwhreew-TenofAF (SYMTUZA) [phone] MG TABS     venlafaxine (EFFEXOR-ER) 75 MG TB24 24 hr tablet     No current facility-administered medications for this visit.      FAMILY/SOCIAL HISTORY:  Reviewed.  Changes as above.     EXAM:  Pleasant, NAD  Breathing comfortably on RA  Alert and oriented  Speech fluent and appropriate      LAB DATA:  Obtained 7/2019:  CMP remarkable for Tbili 2.0 (on atazanavir)  CBC unremarkable  RPR negative  HIV RNA <20  HCV negative  CD4 583    ASSESSMENT AND RECOMMENDATIONS: 34 year old year old male with HIV/AIDS (CD4 alyssia 13) and substance use disorder (methamphetamine, benzos, etoh) returning for follow up.     1)  HIV/AIDS: Longstanding disease with multiple periods of being off of therapy, generally during times of substance abuse relapse or when insurance issues arise. He has a K103 mutation as well as resistance to raltegravir and elvitegravir. He was off Evotaz/Descovy for several months this spring/summer but has been back on for about a month.   - He will come in for labs (CBC, CMP, CD4 and HIV VL; will check amaury/pheno if VL>500).    - Continue Evotaz/Descovy until after labs are obtained and reviewed; will then plan on switching to  Symtuza (aba boosted darunavir/descovy) since he is quite bothered by icterus.  - Follow up visit with labs ~4 weeks after med change.   - Working on better managing mental health/substance use disorder in hopes of preventing future interruptions in ARV therapy.   - See below for routine HIV care.     2)  Substance use disorder:  After a relatively prolonged period of time in recovery, Tejinder started using meth again this spring. He continues to use but feels that he is able to function with current level of use. No injection use.  Agree with him that stable recovery is going to be challenging unless mental health issues are addressed.   - He is aware of chem dep resources.   - Urgent need to address mental health (see below).      3) Depression/anxiety/possible bipolar disorder: Not optimally controlled. He self-discontinued all psych medications this past summer except for effexor as he did not feel that they were helping him.   - I have referred him to our psychiatry team.  - Also looking into options for therapists who perform EMDR (a request of Tejinder).  - Will alert our clinic  so he can follow up to ensure that Tejinder gets into care ASAP.   - If any change in psych meds are made, drug/drug interactions should be assessed for given use of a PI based ARV regimen.     4) Scleral icterus:  Tejinder noticed this after getting back on Evotaz/Descovy. Most likely related to atazanavir (expected side effect).   - Checking Tbili.   - If bili is significantly higher than expected with atazanavir alone or if hyperbilirubinemia persists after changing to Symtuza, will pursue further work up.     5) Routine HIV care.   a. Prophylaxis.   -None needed given CD4.  b. Infectious Disease screening.  -HCV neg 7/2019, repeating with next labs.   -Toxo IgG neg  -Will obtain GC/chlam/syphilis screening  -Quant gold negative August 2014; denies potential exposures since.   c. Vaccination status.   -Completed first Twinrix  "vaccine series April 2011 but did not develop an antibody response to either; repeat series completed 2014.  Seroconverted for Hep B but not Hep A (unusual since the vaccine is highly immunogenic. Will recheck HAV IgG with next labs.    -Pneumovax 2010; booster 2016  -Prevnar August 2014  -Influenza once available.   -Gardasil 2012  -Tdap August 2014  - Completed Menactra 2016  d. Cardiovascular, bone and renal screening.   -Will check lipids with next blood draw    -BP has been okay  -On TAF, no indication for bone density screening  -Cr has been normal, rechecking with next labs  -No longer smoking cigarettes  e. Cancer screening.   -Anal pap due; okay holding off until in-person visit.        Tejinder will return to clinic in one months, sooner if issues arise. He is aware that I am leaving the [facility] and that he will be seeing a new provider at his follow up visit.       Melania Fried MD  [phone]    Tejinder Doyle is a 34 year old male who is being evaluated via a billable telephone visit.      The patient has been notified of following:     \"This telephone visit will be conducted via a call between you and your physician/provider. We have found that certain health care needs can be provided without the need for a physical exam.  This service lets us provide the care you need with a short phone conversation.  If a prescription is necessary we can send it directly to your pharmacy.  If lab work is needed we can place an order for that and you can then stop by our lab to have the test done at a later time.    Telephone visits are billed at different rates depending on your insurance coverage. During this emergency period, for some insurers they may be billed the same as an in-person visit.  Please reach out to your insurance provider with any questions.    If during the course of the call the physician/provider feels a telephone visit is not appropriate, you will not be charged for this " "service.\"    Patient has given verbal consent for Telephone visit?  Yes    What phone number would you like to be contacted at? [phone]    How would you like to obtain your AVS? MyChart    Phone call duration: 20 minutes    Melania Fried MD              Again, thank you for allowing me to participate in the care of your patient.      Sincerely,    Melania Fried MD      "

## 2020-09-03 NOTE — PROGRESS NOTES
REASON FOR VISIT: Follow up HIV.     HISTORY OF PRESENT ILLNESS:  Mr. Doyle is a 34 year-old gentleman with HIV/AIDS (CD4 alyssia 13) who was last seen by me in clinic in July, 2019. After this visit he had multiple months of good adherence to ARVs but several months ago stopped taking his meds due to relapse of methamphetamine use. He stopped ARVs completely and did not have any period of intermittent use. About a month ago he had cut down meth use to the point that he felt he could consistently take ARVs. He therefore restarted and reports good adherence since. He continues to use and does not plan on quitting until his mental health has improved.     He reports a number of significant stressors - his family is still involved in a wrongful death lawsuit about his father (his family is pressing charges). He is very worried about COVID-19 and also the political climate of our country. He is also financially struggling - he has been getting unemployment but does not have stable housing (staying in hotels and airbnbs).  He stopped taking nearly all of his psychiatric medications within the last year. He is now only on effexor. He is unhappy with his current mental health team and so has not seen his therapist for a while. He is hoping to establish care for him with someone with expertise in EMDR - he thinks this approach may be more effective for him. Fortunately, he feels that his mental health is stable currently.     He denies any recent acute illnesses. He has noticed scleral icterus over the past month but has no other symptomatic complaints. He continues to have good support from his mother and friends.       PAST MEDICAL HISTORY:  1. HIV diagnosed in 08/2010, started on Atripla 10/2010. CD4 alyssia 13. Stopped treatment in 9/12 (developed K103 mutation); started Stribild 6/2013.  Off from 10/2014 - 8/2014.  Kaylee/pheno with Ral and Elvitegravir resistance. Boosted atazanavir+ truvada initiated 8/2014.  Stopped  winter of 2014. Evotaz + Truvada started 1/2016. Switched to Evotaz + Descovy 4/2017.  Periods of poor adherence since.   2. 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 Off of suppressive valtrex.  3. Generalized anxiety disorder, depression.  4. Substance use disorder (methamphetamine and benzodiazepines)  5. Status post excision of epidermal inclusion cyst, August 2014.     MEDICATIONS:  Current Outpatient Medications   Medication     Ufuoy-Oxgwy-Wdnvijdy-TenofAF (SYMTUZA) [phone] MG TABS     venlafaxine (EFFEXOR-ER) 75 MG TB24 24 hr tablet     No current facility-administered medications for this visit.      FAMILY/SOCIAL HISTORY:  Reviewed.  Changes as above.     EXAM:  Pleasant, NAD  Breathing comfortably on RA  Alert and oriented  Speech fluent and appropriate      LAB DATA:  Obtained 7/2019:  CMP remarkable for Tbili 2.0 (on atazanavir)  CBC unremarkable  RPR negative  HIV RNA <20  HCV negative  CD4 583    ASSESSMENT AND RECOMMENDATIONS: 34 year old year old male with HIV/AIDS (CD4 alyssia 13) and substance use disorder (methamphetamine, benzos, etoh) returning for follow up.     1)  HIV/AIDS: Longstanding disease with multiple periods of being off of therapy, generally during times of substance abuse relapse or when insurance issues arise. He has a K103 mutation as well as resistance to raltegravir and elvitegravir. He was off Evotaz/Descovy for several months this spring/summer but has been back on for about a month.   - He will come in for labs (CBC, CMP, CD4 and HIV VL; will check amaury/pheno if VL>500).    - Continue Evotaz/Descovy until after labs are obtained and reviewed; will then plan on switching to Symtuza (aba boosted darunavir/descovy) since he is quite bothered by icterus.  - Follow up visit with labs ~4 weeks after med change.   - Working on better managing mental health/substance use disorder in hopes of preventing future interruptions in ARV therapy.   - See below for routine HIV care.     2)   Substance use disorder:  After a relatively prolonged period of time in recovery, Tejinder started using meth again this spring. He continues to use but feels that he is able to function with current level of use. No injection use.  Agree with him that stable recovery is going to be challenging unless mental health issues are addressed.   - He is aware of chem dep resources.   - Urgent need to address mental health (see below).      3) Depression/anxiety/possible bipolar disorder: Not optimally controlled. He self-discontinued all psych medications this past summer except for effexor as he did not feel that they were helping him.   - I have referred him to our psychiatry team.  - Also looking into options for therapists who perform EMDR (a request of Tejinder).  - Will alert our clinic  so he can follow up to ensure that Tejinder gets into care ASAP.   - If any change in psych meds are made, drug/drug interactions should be assessed for given use of a PI based ARV regimen.     4) Scleral icterus:  Tejinder noticed this after getting back on Evotaz/Descovy. Most likely related to atazanavir (expected side effect).   - Checking Tbili.   - If bili is significantly higher than expected with atazanavir alone or if hyperbilirubinemia persists after changing to Symtuza, will pursue further work up.     5) Routine HIV care.   a. Prophylaxis.   -None needed given CD4.  b. Infectious Disease screening.  -HCV neg 7/2019, repeating with next labs.   -Toxo IgG neg  -Will obtain GC/chlam/syphilis screening  -Quant gold negative August 2014; denies potential exposures since.   c. Vaccination status.   -Completed first Twinrix vaccine series April 2011 but did not develop an antibody response to either; repeat series completed 2014.  Seroconverted for Hep B but not Hep A (unusual since the vaccine is highly immunogenic. Will recheck HAV IgG with next labs.    -Pneumovax 2010; booster 2016  -Prevnar August 2014  -Influenza once  "available.   -Gardasil 2012  -Tdap August 2014  - Completed Menactra 2016  d. Cardiovascular, bone and renal screening.   -Will check lipids with next blood draw    -BP has been okay  -On TAF, no indication for bone density screening  -Cr has been normal, rechecking with next labs  -No longer smoking cigarettes  e. Cancer screening.   -Anal pap due; okay holding off until in-person visit.        Tejinder will return to clinic in one months, sooner if issues arise. He is aware that I am leaving the [facility] and that he will be seeing a new provider at his follow up visit.       Melania Fried MD  [phone]    Tejinder Doyle is a 34 year old male who is being evaluated via a billable telephone visit.      The patient has been notified of following:     \"This telephone visit will be conducted via a call between you and your physician/provider. We have found that certain health care needs can be provided without the need for a physical exam.  This service lets us provide the care you need with a short phone conversation.  If a prescription is necessary we can send it directly to your pharmacy.  If lab work is needed we can place an order for that and you can then stop by our lab to have the test done at a later time.    Telephone visits are billed at different rates depending on your insurance coverage. During this emergency period, for some insurers they may be billed the same as an in-person visit.  Please reach out to your insurance provider with any questions.    If during the course of the call the physician/provider feels a telephone visit is not appropriate, you will not be charged for this service.\"    Patient has given verbal consent for Telephone visit?  Yes    What phone number would you like to be contacted at? [phone]    How would you like to obtain your AVS? "[employer]hart    Phone call duration: 20 minutes    Melania Fried MD            "

## 2020-09-17 ENCOUNTER — TELEPHONE (OUTPATIENT)
Dept: INFECTIOUS DISEASES | Facility: CLINIC | Age: 35
End: 2020-09-17

## 2020-11-10 ENCOUNTER — VIRTUAL VISIT (OUTPATIENT)
Dept: INFECTIOUS DISEASES | Facility: CLINIC | Age: 35
End: 2020-11-10
Attending: INTERNAL MEDICINE
Payer: COMMERCIAL

## 2020-11-10 DIAGNOSIS — Z21 ASYMPTOMATIC HUMAN IMMUNODEFICIENCY VIRUS (HIV) INFECTION STATUS (H): Primary | ICD-10-CM

## 2020-11-10 RX ORDER — LAMOTRIGINE 150 MG/1
TABLET ORAL
COMMUNITY
Start: 2020-10-19

## 2020-11-10 RX ORDER — CLONAZEPAM 2 MG/1
TABLET ORAL
COMMUNITY
Start: 2020-10-19

## 2020-11-10 RX ORDER — QUETIAPINE FUMARATE 100 MG/1
TABLET, FILM COATED ORAL
COMMUNITY
Start: 2020-10-19

## 2020-11-10 ASSESSMENT — PAIN SCALES - GENERAL: PAINLEVEL: NO PAIN (0)

## 2020-11-10 NOTE — LETTER
"11/10/2020       RE: Tejinder Doyle  [address]  [facility] [address]     Dear Colleague,    Thank you for referring your patient, Tejinder Doyle, to the [facility] INFECTIOUS DISEASE CLINIC [place] at [facility]. Please see a copy of my visit note below.    Tejinder Doyle is a 34 year old male who is being evaluated via a billable video visit.      The patient has been notified of following:     \"This video visit will be conducted via a call between you and your physician/provider. We have found that certain health care needs can be provided without the need for an in-person physical exam.  This service lets us provide the care you need with a video conversation.  If a prescription is necessary we can send it directly to your pharmacy.  If lab work is needed we can place an order for that and you can then stop by our lab to have the test done at a later time.  [employer]  Video visits are billed at different rates depending on your insurance coverage.  Please reach out to your insurance provider with any questions.    If during the course of the call the physician/provider feels a video visit is not appropriate, you will not be charged for this service.\"    Patient has given verbal consent for Video visit? Yes  How would you like to obtain your AVS? MyChart  If you are dropped from the video visit, the video invite should be resent to: Send to e-mail at: [email or web address]  Will anyone else be joining your video visit? No        Video-Visit Details    The patient did not come to the visit in spite of multiple invitations and a phone call.     Shabnam Calloway MD            Again, thank you for allowing me to participate in the care of your patient.      Sincerely,    Shabnam Calloway MD      "

## 2020-11-10 NOTE — LETTER
Date:November 16, 2020      Provider requested that no letter be sent. Do not send.       [facility] Health Information

## 2020-11-10 NOTE — PROGRESS NOTES
"Tejinder Doyle is a 34 year old male who is being evaluated via a billable video visit.      The patient has been notified of following:     \"This video visit will be conducted via a call between you and your physician/provider. We have found that certain health care needs can be provided without the need for an in-person physical exam.  This service lets us provide the care you need with a video conversation.  If a prescription is necessary we can send it directly to your pharmacy.  If lab work is needed we can place an order for that and you can then stop by our lab to have the test done at a later time.  [employer]  Video visits are billed at different rates depending on your insurance coverage.  Please reach out to your insurance provider with any questions.    If during the course of the call the physician/provider feels a video visit is not appropriate, you will not be charged for this service.\"    Patient has given verbal consent for Video visit? Yes  How would you like to obtain your AVS? MyChart  If you are dropped from the video visit, the video invite should be resent to: Send to e-mail at: [email or web address]  Will anyone else be joining your video visit? No        Video-Visit Details    The patient did not come to the visit in spite of multiple invitations and a phone call.     Shabnam Calloway MD        "

## 2020-12-14 ENCOUNTER — HEALTH MAINTENANCE LETTER (OUTPATIENT)
Age: 35
End: 2020-12-14

## 2021-02-08 ENCOUNTER — TELEPHONE (OUTPATIENT)
Dept: INFECTIOUS DISEASES | Facility: CLINIC | Age: 36
End: 2021-02-08

## 2021-02-08 NOTE — TELEPHONE ENCOUNTER
I was notified a refill request was received from a pharmacy in [place]. I tried to contact pt and the phone number we have is inactive. I asked our RN to include a note with the refill to have him contact us about f/up .

## 2021-02-08 NOTE — TELEPHONE ENCOUNTER
My pharmacist that worked on Saturday told me Tejinder called here to get us to transfer his Symtuza back from [place]. He called @ 11:50 AM (we close at Noon) and they did try to transfer, fill and bring the med to [facility] but [facility] was closed and they couldn't get the transfer. The pt was advised to try another pharmacy after the [facility] opens. The telephone we have in our system matches yours.     Shreya Maldonado CPhT  [facility]  [phone]

## 2021-04-17 ENCOUNTER — HEALTH MAINTENANCE LETTER (OUTPATIENT)
Age: 36
End: 2021-04-17

## 2021-09-07 DIAGNOSIS — Z21 ASYMPTOMATIC HUMAN IMMUNODEFICIENCY VIRUS (HIV) INFECTION STATUS (H): ICD-10-CM

## 2021-09-07 RX ORDER — DARUNAVIR, COBICISTAT, EMTRICITABINE, AND TENOFOVIR ALAFENAMIDE 800; 150; 200; 10 MG/1; MG/1; MG/1; MG/1
1 TABLET, FILM COATED ORAL DAILY
Qty: 30 TABLET | Refills: 2 | Status: SHIPPED | OUTPATIENT
Start: 2021-09-07

## 2021-09-07 NOTE — TELEPHONE ENCOUNTER
Pt called today about a refill on his Symtuza. He is currently in [facility] and was pretty vague if he was planning on living there for good or if it was temporary. The problem is his Symtuza rx  and he was last seen by Dr Fried and hadn't established with a new ID MD. I transferred him to [facility] . I am hoping they can connect and either get him back here for an appointment or have him see someone out there. He has been without medication for at least a month.     Shreya Maldonado, [place]  [facility]  [phone]

## 2021-10-02 ENCOUNTER — HEALTH MAINTENANCE LETTER (OUTPATIENT)
Age: 36
End: 2021-10-02

## 2022-05-08 ENCOUNTER — HEALTH MAINTENANCE LETTER (OUTPATIENT)
Age: 37
End: 2022-05-08

## 2023-01-14 ENCOUNTER — HEALTH MAINTENANCE LETTER (OUTPATIENT)
Age: 38
End: 2023-01-14

## 2023-01-20 NOTE — PROGRESS NOTES
03/24/18 8702   Behavioral Health   Hallucinations denies / not responding to hallucinations   Thinking intact   Orientation time: oriented;date: oriented;place: oriented   Memory baseline memory   Insight insight appropriate to events   Judgement impaired   Eye Contact at examiner   Affect blunted, flat   Mood mood is calm   Physical Appearance/Attire attire appropriate to age and situation   Hygiene neglected grooming - unclean body, hair, teeth   Suicidality (denied )   1. Wish to be Dead No   2. Non-Specific Active Suicidal Thoughts  No   Self Injury (denied )   Elopement (denied )   Speech coherent;clear   Medication Sensitivity no observed side effects;no stated side effects   Psychomotor / Gait balanced;steady   Psycho Education   Type of Intervention 1:1 intervention   Response participates, initiates socially appropriate   Hours 0.5   Activities of Daily Living   Hygiene/Grooming independent   Oral Hygiene independent   Dress street clothes   Laundry with supervision   Room Organization independent   Activity   Activity Assistance Provided independent   Behavioral Health Interventions   Depression maintain safety precautions;monitor need to revise level of observation;maintain safe secure environment;encourage participation / independence with adls   Social and Therapeutic Interventions (Depression) encourage participation in therapeutic groups and milieu activities;encourage effective boundaries with peers;encourage socialization with peers     Pt. Was calm and cooperative. Pt. Spent in his room the majority part of the shift. Pt. Denied any paranoid thoughts and psychotic symptoms. Pt. Said he is still experiencing some minor depression and anxiety. Pt. Is looking forward to discuss his discharging plan on Monday with the doctor. Pt. Denied SI/SIb. Pt's appetite was good and sleeping and napping well.      cardiovascular

## 2023-06-02 ENCOUNTER — HEALTH MAINTENANCE LETTER (OUTPATIENT)
Age: 38
End: 2023-06-02